# Patient Record
Sex: FEMALE | Race: WHITE | Employment: FULL TIME | URBAN - METROPOLITAN AREA
[De-identification: names, ages, dates, MRNs, and addresses within clinical notes are randomized per-mention and may not be internally consistent; named-entity substitution may affect disease eponyms.]

---

## 2017-10-17 ENCOUNTER — HOSPITAL ENCOUNTER (OUTPATIENT)
Dept: MAMMOGRAPHY | Age: 52
Discharge: HOME | End: 2017-10-17
Attending: OBSTETRICS & GYNECOLOGY | Admitting: OBSTETRICS & GYNECOLOGY

## 2017-10-17 DIAGNOSIS — Z12.31 ENCOUNTER FOR SCREENING MAMMOGRAM FOR MALIGNANT NEOPLASM OF BREAST: ICD-10-CM

## 2018-05-18 ENCOUNTER — OFFICE VISIT (OUTPATIENT)
Dept: ORTHOPEDIC SURGERY | Age: 53
End: 2018-05-18

## 2018-05-18 ENCOUNTER — HOSPITAL ENCOUNTER (OUTPATIENT)
Dept: PHYSICAL THERAPY | Age: 53
Discharge: HOME | End: 2018-05-31
Attending: ORTHOPAEDIC SURGERY | Admitting: ORTHOPAEDIC SURGERY

## 2018-05-18 VITALS
SYSTOLIC BLOOD PRESSURE: 103 MMHG | WEIGHT: 154.98 LBS | HEIGHT: 65 IN | BODY MASS INDEX: 25.82 KG/M2 | DIASTOLIC BLOOD PRESSURE: 65 MMHG | HEART RATE: 65 BPM

## 2018-05-18 DIAGNOSIS — M17.0 PRIMARY OSTEOARTHRITIS OF BOTH KNEES: Primary | ICD-10-CM

## 2018-05-18 DIAGNOSIS — M70.50 PES ANSERINE BURSITIS: ICD-10-CM

## 2018-05-18 DIAGNOSIS — M25.562 PAIN IN BOTH KNEES, UNSPECIFIED CHRONICITY: ICD-10-CM

## 2018-05-18 DIAGNOSIS — M25.561 PAIN IN BOTH KNEES, UNSPECIFIED CHRONICITY: ICD-10-CM

## 2018-05-18 PROCEDURE — 99213 OFFICE O/P EST LOW 20 MIN: CPT | Performed by: ORTHOPAEDIC SURGERY

## 2018-05-21 ENCOUNTER — HOSPITAL ENCOUNTER (OUTPATIENT)
Dept: PHYSICAL THERAPY | Age: 53
Discharge: HOME | End: 2018-05-22
Admitting: ORTHOPAEDIC SURGERY

## 2018-05-24 ENCOUNTER — HOSPITAL ENCOUNTER (OUTPATIENT)
Dept: PHYSICAL THERAPY | Age: 53
Discharge: HOME | End: 2018-05-25
Admitting: ORTHOPAEDIC SURGERY

## 2018-05-30 ENCOUNTER — TELEPHONE (OUTPATIENT)
Dept: ORTHOPEDIC SURGERY | Age: 53
End: 2018-05-30

## 2018-05-30 ENCOUNTER — HOSPITAL ENCOUNTER (OUTPATIENT)
Dept: PHYSICAL THERAPY | Age: 53
Discharge: HOME | End: 2018-06-30
Admitting: ORTHOPAEDIC SURGERY

## 2018-05-31 ENCOUNTER — OFFICE VISIT (OUTPATIENT)
Dept: ORTHOPEDIC SURGERY | Age: 53
End: 2018-05-31

## 2018-05-31 DIAGNOSIS — M70.50 PES ANSERINE BURSITIS: ICD-10-CM

## 2018-05-31 DIAGNOSIS — M17.0 PRIMARY OSTEOARTHRITIS OF BOTH KNEES: Primary | ICD-10-CM

## 2018-05-31 PROCEDURE — 20611 DRAIN/INJ JOINT/BURSA W/US: CPT | Performed by: PHYSICIAN ASSISTANT

## 2018-05-31 PROCEDURE — 99213 OFFICE O/P EST LOW 20 MIN: CPT | Performed by: PHYSICIAN ASSISTANT

## 2018-05-31 RX ORDER — DEXAMETHASONE SODIUM PHOSPHATE 4 MG/ML
4 INJECTION, SOLUTION INTRA-ARTICULAR; INTRALESIONAL; INTRAMUSCULAR; INTRAVENOUS; SOFT TISSUE PRN
Qty: 30 ML | Refills: 0 | Status: SHIPPED | OUTPATIENT
Start: 2018-05-31

## 2018-06-01 ENCOUNTER — HOSPITAL ENCOUNTER (OUTPATIENT)
Dept: PHYSICAL THERAPY | Age: 53
Discharge: HOME | End: 2018-06-02
Admitting: ORTHOPAEDIC SURGERY

## 2018-06-01 ENCOUNTER — HOSPITAL ENCOUNTER (OUTPATIENT)
Dept: PHYSICAL THERAPY | Age: 53
Discharge: HOME | End: 2018-06-01
Attending: ORTHOPAEDIC SURGERY | Admitting: ORTHOPAEDIC SURGERY

## 2018-06-06 ENCOUNTER — HOSPITAL ENCOUNTER (OUTPATIENT)
Dept: PHYSICAL THERAPY | Age: 53
Discharge: HOME | End: 2018-06-07
Admitting: ORTHOPAEDIC SURGERY

## 2018-06-07 ENCOUNTER — OFFICE VISIT (OUTPATIENT)
Dept: ORTHOPEDIC SURGERY | Age: 53
End: 2018-06-07

## 2018-06-07 DIAGNOSIS — M17.0 PRIMARY OSTEOARTHRITIS OF BOTH KNEES: Primary | ICD-10-CM

## 2018-06-07 PROCEDURE — 99999 PR OFFICE/OUTPT VISIT,PROCEDURE ONLY: CPT | Performed by: PHYSICIAN ASSISTANT

## 2018-06-07 PROCEDURE — 20611 DRAIN/INJ JOINT/BURSA W/US: CPT | Performed by: PHYSICIAN ASSISTANT

## 2018-06-08 ENCOUNTER — HOSPITAL ENCOUNTER (OUTPATIENT)
Dept: PHYSICAL THERAPY | Age: 53
Discharge: HOME | End: 2018-06-09
Admitting: ORTHOPAEDIC SURGERY

## 2018-06-13 ENCOUNTER — HOSPITAL ENCOUNTER (OUTPATIENT)
Dept: PHYSICAL THERAPY | Age: 53
Discharge: HOME | End: 2018-06-14
Admitting: ORTHOPAEDIC SURGERY

## 2018-06-14 ENCOUNTER — OFFICE VISIT (OUTPATIENT)
Dept: ORTHOPEDIC SURGERY | Age: 53
End: 2018-06-14

## 2018-06-14 DIAGNOSIS — M17.0 PRIMARY OSTEOARTHRITIS OF BOTH KNEES: Primary | ICD-10-CM

## 2018-06-14 PROCEDURE — 20611 DRAIN/INJ JOINT/BURSA W/US: CPT | Performed by: PHYSICIAN ASSISTANT

## 2018-06-14 PROCEDURE — 99999 PR OFFICE/OUTPT VISIT,PROCEDURE ONLY: CPT | Performed by: PHYSICIAN ASSISTANT

## 2018-06-27 ENCOUNTER — HOSPITAL ENCOUNTER (OUTPATIENT)
Dept: PHYSICAL THERAPY | Age: 53
Discharge: HOME | End: 2018-06-28
Admitting: ORTHOPAEDIC SURGERY

## 2018-07-01 ENCOUNTER — HOSPITAL ENCOUNTER (OUTPATIENT)
Dept: PHYSICAL THERAPY | Age: 53
Discharge: HOME | End: 2018-07-01
Attending: ORTHOPAEDIC SURGERY | Admitting: ORTHOPAEDIC SURGERY

## 2018-11-12 ENCOUNTER — HOSPITAL ENCOUNTER (OUTPATIENT)
Dept: WOMENS IMAGING | Age: 53
Discharge: HOME | End: 2018-11-12
Payer: COMMERCIAL

## 2018-11-12 DIAGNOSIS — Z12.31 ENCOUNTER FOR SCREENING MAMMOGRAM FOR BREAST CANCER: ICD-10-CM

## 2018-11-12 PROCEDURE — 77063 BREAST TOMOSYNTHESIS BI: CPT

## 2019-12-04 ENCOUNTER — HOSPITAL ENCOUNTER (OUTPATIENT)
Dept: WOMENS IMAGING | Age: 54
Discharge: HOME | End: 2019-12-04
Payer: COMMERCIAL

## 2019-12-04 DIAGNOSIS — Z12.31 VISIT FOR SCREENING MAMMOGRAM: ICD-10-CM

## 2019-12-04 PROCEDURE — 77063 BREAST TOMOSYNTHESIS BI: CPT

## 2020-06-05 ENCOUNTER — OFFICE VISIT (OUTPATIENT)
Dept: ORTHOPEDIC SURGERY | Age: 55
End: 2020-06-05
Payer: COMMERCIAL

## 2020-06-05 VITALS — WEIGHT: 154.98 LBS | HEIGHT: 65 IN | BODY MASS INDEX: 25.82 KG/M2

## 2020-06-05 PROCEDURE — 99213 OFFICE O/P EST LOW 20 MIN: CPT | Performed by: PHYSICIAN ASSISTANT

## 2020-06-16 ENCOUNTER — TELEPHONE (OUTPATIENT)
Dept: ORTHOPEDIC SURGERY | Age: 55
End: 2020-06-16

## 2020-06-22 ENCOUNTER — NURSE ONLY (OUTPATIENT)
Dept: ORTHOPEDIC SURGERY | Age: 55
End: 2020-06-22
Payer: COMMERCIAL

## 2020-06-22 PROCEDURE — 20611 DRAIN/INJ JOINT/BURSA W/US: CPT | Performed by: PHYSICIAN ASSISTANT

## 2020-06-22 RX ORDER — HYALURONATE SODIUM 10 MG/ML
40 SYRINGE (ML) INTRAARTICULAR ONCE
Status: COMPLETED | OUTPATIENT
Start: 2020-06-22 | End: 2020-06-22

## 2020-06-22 RX ADMIN — Medication 40 MG: at 11:00

## 2020-06-29 ENCOUNTER — NURSE ONLY (OUTPATIENT)
Dept: ORTHOPEDIC SURGERY | Age: 55
End: 2020-06-29
Payer: COMMERCIAL

## 2020-06-29 PROCEDURE — 20611 DRAIN/INJ JOINT/BURSA W/US: CPT | Performed by: PHYSICIAN ASSISTANT

## 2020-06-29 RX ORDER — HYALURONATE SODIUM 10 MG/ML
40 SYRINGE (ML) INTRAARTICULAR ONCE
Status: COMPLETED | OUTPATIENT
Start: 2020-06-29 | End: 2020-06-29

## 2020-06-29 RX ADMIN — Medication 40 MG: at 10:59

## 2020-07-02 ENCOUNTER — APPOINTMENT (OUTPATIENT)
Dept: CT IMAGING | Age: 55
End: 2020-07-02
Payer: COMMERCIAL

## 2020-07-02 ENCOUNTER — HOSPITAL ENCOUNTER (OUTPATIENT)
Age: 55
Setting detail: OBSERVATION
Discharge: HOME | End: 2020-07-05
Attending: EMERGENCY MEDICINE | Admitting: INTERNAL MEDICINE
Payer: COMMERCIAL

## 2020-07-02 PROBLEM — K57.92 ACUTE DIVERTICULITIS: Status: ACTIVE | Noted: 2020-07-02

## 2020-07-02 LAB
A/G RATIO: 1.5 (ref 1.1–2.2)
ALBUMIN SERPL-MCNC: 4.3 G/DL (ref 3.4–5)
ALP BLD-CCNC: 74 U/L (ref 40–129)
ALT SERPL-CCNC: 18 U/L (ref 10–40)
ANION GAP SERPL CALCULATED.3IONS-SCNC: 11 MMOL/L (ref 3–16)
AST SERPL-CCNC: 21 U/L (ref 15–37)
BASOPHILS ABSOLUTE: 0.1 K/UL (ref 0–0.2)
BASOPHILS RELATIVE PERCENT: 0.5 %
BILIRUB SERPL-MCNC: 0.3 MG/DL (ref 0–1)
BILIRUBIN URINE: NEGATIVE
BLOOD, URINE: NEGATIVE
BUN BLDV-MCNC: 10 MG/DL (ref 7–20)
CALCIUM SERPL-MCNC: 10.1 MG/DL (ref 8.3–10.6)
CHLORIDE BLD-SCNC: 96 MMOL/L (ref 99–110)
CLARITY: CLEAR
CO2: 30 MMOL/L (ref 21–32)
COLOR: YELLOW
CREAT SERPL-MCNC: 0.8 MG/DL (ref 0.6–1.1)
EOSINOPHILS ABSOLUTE: 0.4 K/UL (ref 0–0.6)
EOSINOPHILS RELATIVE PERCENT: 3.8 %
GFR AFRICAN AMERICAN: >60
GFR NON-AFRICAN AMERICAN: >60
GLOBULIN: 2.8 G/DL
GLUCOSE BLD-MCNC: 83 MG/DL (ref 70–99)
GLUCOSE URINE: NEGATIVE MG/DL
HCT VFR BLD CALC: 44.1 % (ref 36–48)
HEMOGLOBIN: 14.9 G/DL (ref 12–16)
KETONES, URINE: NEGATIVE MG/DL
LEUKOCYTE ESTERASE, URINE: NEGATIVE
LYMPHOCYTES ABSOLUTE: 3.9 K/UL (ref 1–5.1)
LYMPHOCYTES RELATIVE PERCENT: 33 %
MAGNESIUM: 1.9 MG/DL (ref 1.8–2.4)
MCH RBC QN AUTO: 30.8 PG (ref 26–34)
MCHC RBC AUTO-ENTMCNC: 33.7 G/DL (ref 31–36)
MCV RBC AUTO: 91.3 FL (ref 80–100)
MICROSCOPIC EXAMINATION: NORMAL
MONOCYTES ABSOLUTE: 0.8 K/UL (ref 0–1.3)
MONOCYTES RELATIVE PERCENT: 6.5 %
NEUTROPHILS ABSOLUTE: 6.6 K/UL (ref 1.7–7.7)
NEUTROPHILS RELATIVE PERCENT: 56.2 %
NITRITE, URINE: NEGATIVE
PDW BLD-RTO: 13 % (ref 12.4–15.4)
PH UA: 7 (ref 5–8)
PLATELET # BLD: 251 K/UL (ref 135–450)
PMV BLD AUTO: 9.6 FL (ref 5–10.5)
POTASSIUM REFLEX MAGNESIUM: 2.8 MMOL/L (ref 3.5–5.1)
PROTEIN UA: NEGATIVE MG/DL
RBC # BLD: 4.83 M/UL (ref 4–5.2)
SODIUM BLD-SCNC: 137 MMOL/L (ref 136–145)
SPECIFIC GRAVITY UA: 1.01 (ref 1–1.03)
TOTAL PROTEIN: 7.1 G/DL (ref 6.4–8.2)
URINE TYPE: NORMAL
UROBILINOGEN, URINE: 0.2 E.U./DL
WBC # BLD: 11.8 K/UL (ref 4–11)

## 2020-07-02 PROCEDURE — 85025 COMPLETE CBC W/AUTO DIFF WBC: CPT

## 2020-07-02 PROCEDURE — 6370000000 HC RX 637 (ALT 250 FOR IP): Performed by: NURSE PRACTITIONER

## 2020-07-02 PROCEDURE — 83735 ASSAY OF MAGNESIUM: CPT

## 2020-07-02 PROCEDURE — 81003 URINALYSIS AUTO W/O SCOPE: CPT

## 2020-07-02 PROCEDURE — 99285 EMERGENCY DEPT VISIT HI MDM: CPT

## 2020-07-02 PROCEDURE — 2580000003 HC RX 258: Performed by: NURSE PRACTITIONER

## 2020-07-02 PROCEDURE — G0378 HOSPITAL OBSERVATION PER HR: HCPCS

## 2020-07-02 PROCEDURE — 6360000004 HC RX CONTRAST MEDICATION: Performed by: EMERGENCY MEDICINE

## 2020-07-02 PROCEDURE — 80053 COMPREHEN METABOLIC PANEL: CPT

## 2020-07-02 PROCEDURE — 74177 CT ABD & PELVIS W/CONTRAST: CPT

## 2020-07-02 PROCEDURE — 96365 THER/PROPH/DIAG IV INF INIT: CPT

## 2020-07-02 PROCEDURE — 6360000002 HC RX W HCPCS: Performed by: NURSE PRACTITIONER

## 2020-07-02 PROCEDURE — 1200000000 HC SEMI PRIVATE

## 2020-07-02 RX ORDER — MELOXICAM 15 MG/1
15 TABLET ORAL DAILY
COMMUNITY

## 2020-07-02 RX ORDER — PROMETHAZINE HYDROCHLORIDE 25 MG/1
12.5 TABLET ORAL EVERY 6 HOURS PRN
Status: DISCONTINUED | OUTPATIENT
Start: 2020-07-02 | End: 2020-07-05 | Stop reason: HOSPADM

## 2020-07-02 RX ORDER — ACETAMINOPHEN 325 MG/1
650 TABLET ORAL EVERY 4 HOURS PRN
Status: DISCONTINUED | OUTPATIENT
Start: 2020-07-02 | End: 2020-07-05 | Stop reason: HOSPADM

## 2020-07-02 RX ORDER — LANSOPRAZOLE
15 KIT
Status: DISCONTINUED | OUTPATIENT
Start: 2020-07-03 | End: 2020-07-05 | Stop reason: HOSPADM

## 2020-07-02 RX ORDER — ONDANSETRON 2 MG/ML
4 INJECTION INTRAMUSCULAR; INTRAVENOUS EVERY 6 HOURS PRN
Status: DISCONTINUED | OUTPATIENT
Start: 2020-07-02 | End: 2020-07-05 | Stop reason: HOSPADM

## 2020-07-02 RX ORDER — OXYCODONE HYDROCHLORIDE AND ACETAMINOPHEN 5; 325 MG/1; MG/1
1 TABLET ORAL EVERY 6 HOURS PRN
Status: DISCONTINUED | OUTPATIENT
Start: 2020-07-02 | End: 2020-07-05 | Stop reason: HOSPADM

## 2020-07-02 RX ORDER — CETIRIZINE HYDROCHLORIDE 10 MG/1
10 TABLET ORAL DAILY
Status: DISCONTINUED | OUTPATIENT
Start: 2020-07-03 | End: 2020-07-05 | Stop reason: HOSPADM

## 2020-07-02 RX ORDER — ESOMEPRAZOLE MAGNESIUM 20 MG/1
20 FOR SUSPENSION ORAL DAILY
Status: DISCONTINUED | OUTPATIENT
Start: 2020-07-03 | End: 2020-07-02 | Stop reason: CLARIF

## 2020-07-02 RX ORDER — POTASSIUM CHLORIDE 20 MEQ/1
40 TABLET, EXTENDED RELEASE ORAL PRN
Status: DISCONTINUED | OUTPATIENT
Start: 2020-07-02 | End: 2020-07-05 | Stop reason: HOSPADM

## 2020-07-02 RX ORDER — POTASSIUM CHLORIDE 7.45 MG/ML
10 INJECTION INTRAVENOUS PRN
Status: DISCONTINUED | OUTPATIENT
Start: 2020-07-02 | End: 2020-07-05 | Stop reason: HOSPADM

## 2020-07-02 RX ORDER — SODIUM CHLORIDE 0.9 % (FLUSH) 0.9 %
10 SYRINGE (ML) INJECTION PRN
Status: DISCONTINUED | OUTPATIENT
Start: 2020-07-02 | End: 2020-07-05 | Stop reason: HOSPADM

## 2020-07-02 RX ORDER — BUTALBITAL, ACETAMINOPHEN AND CAFFEINE 50; 325; 40 MG/1; MG/1; MG/1
1 TABLET ORAL EVERY 4 HOURS PRN
COMMUNITY

## 2020-07-02 RX ORDER — SODIUM CHLORIDE 9 MG/ML
INJECTION, SOLUTION INTRAVENOUS CONTINUOUS
Status: DISCONTINUED | OUTPATIENT
Start: 2020-07-02 | End: 2020-07-05 | Stop reason: HOSPADM

## 2020-07-02 RX ORDER — POTASSIUM CHLORIDE 20 MEQ/1
40 TABLET, EXTENDED RELEASE ORAL ONCE
Status: COMPLETED | OUTPATIENT
Start: 2020-07-02 | End: 2020-07-02

## 2020-07-02 RX ORDER — PROPRANOLOL HYDROCHLORIDE 40 MG/1
40 TABLET ORAL 2 TIMES DAILY
Status: DISCONTINUED | OUTPATIENT
Start: 2020-07-02 | End: 2020-07-05 | Stop reason: HOSPADM

## 2020-07-02 RX ORDER — SODIUM CHLORIDE 0.9 % (FLUSH) 0.9 %
10 SYRINGE (ML) INJECTION EVERY 12 HOURS SCHEDULED
Status: DISCONTINUED | OUTPATIENT
Start: 2020-07-02 | End: 2020-07-05 | Stop reason: HOSPADM

## 2020-07-02 RX ORDER — TRIAMTERENE AND HYDROCHLOROTHIAZIDE 37.5; 25 MG/1; MG/1
2 TABLET ORAL DAILY
Status: DISCONTINUED | OUTPATIENT
Start: 2020-07-03 | End: 2020-07-05 | Stop reason: HOSPADM

## 2020-07-02 RX ADMIN — OXYCODONE HYDROCHLORIDE AND ACETAMINOPHEN 1 TABLET: 5; 325 TABLET ORAL at 23:07

## 2020-07-02 RX ADMIN — PROPRANOLOL HYDROCHLORIDE 40 MG: 40 TABLET ORAL at 22:08

## 2020-07-02 RX ADMIN — SODIUM CHLORIDE: 9 INJECTION, SOLUTION INTRAVENOUS at 22:08

## 2020-07-02 RX ADMIN — IOPAMIDOL 75 ML: 755 INJECTION, SOLUTION INTRAVENOUS at 17:19

## 2020-07-02 RX ADMIN — Medication 10 ML: at 22:07

## 2020-07-02 RX ADMIN — MEROPENEM 1 G: 1 INJECTION, POWDER, FOR SOLUTION INTRAVENOUS at 19:42

## 2020-07-02 RX ADMIN — POTASSIUM CHLORIDE 40 MEQ: 1500 TABLET, EXTENDED RELEASE ORAL at 18:14

## 2020-07-02 ASSESSMENT — PAIN DESCRIPTION - LOCATION
LOCATION: ABDOMEN

## 2020-07-02 ASSESSMENT — PAIN SCALES - GENERAL
PAINLEVEL_OUTOF10: 7
PAINLEVEL_OUTOF10: 4
PAINLEVEL_OUTOF10: 7
PAINLEVEL_OUTOF10: 6
PAINLEVEL_OUTOF10: 4

## 2020-07-02 ASSESSMENT — PAIN DESCRIPTION - PAIN TYPE: TYPE: ACUTE PAIN

## 2020-07-02 NOTE — ED PROVIDER NOTES
I independently performed a history and physical on Yanet Macias. All diagnostic, treatment, and disposition decisions were made by myself in conjunction with the advanced practice provider.     -Yanet Macias is a 63 y.o. female presents to ED for possible abdominal abscess. Had multiple episodes of diverticulitis been treated with oral antibiotics, last scan showed that she had diverticulitis with an abscess again treated with oral antibiotics. She was being evaluated by Dr. Patricia Mendoza as a follow-up. Despite being on antibiotics for a month, states that abdominal pain had returned, and was sent to the ED for reevaluation to rule out another recurrence of abdominal abscess. -PE: well appearing, nontoxic, not in acute distress. RRR, CTAB/l, no w/r/r, abdomen soft, ND, tenderness to left lower quadrant, + BS x 4, no rigidity, rebound, guarding  -Lab work was significant for hypokalemia of 2.8, mild leukocytosis of 11.8. CT abdomen and pelvis shows findings compatible with mild acute uncomplicated diverticulitis at the junction of the descending colon and sigmoid colon. Despite the given history of mild diverticulosis is not detected. No perforation is seen. I was consulted spoke with Dr. Chelo Goins who recommend the patient be admitted and started on Merrem I.V. as she this is a repeat of diverticulitis despite multiple outpatient therapy.  -Plan for admission for further workup and treatment discussed with patient who is in agreement with plan and have no further questions/concerns    For further details of Mariano JEFFREY emergency department encounter, please see LUCINDA Chiu documentation.         Denilson Roper MD  07/02/20 3164

## 2020-07-02 NOTE — ED PROVIDER NOTES
Evaluated by [id number] [facility] Provider    [facility]  ED    CHIEF COMPLAINT  Abdominal Pain (Pt has abscess on sigmoid colon.  )    HISTORY OF PRESENT ILLNESS  Carmelo Alvarado is a 63 y.o. female who presents to the ED complaining of abdominal pain. Has a abscess on her sigmoid colon. May 5th had CT saw diverticulitis treated with oral antibiotics. June 17th had another CT that showed an abscess. She saw Dr. Colt Dean today in the office for follow up and he wanted her to come for testing and antibiotics. She has been on antibiotics for a month. Augmentin then augmentin and bactrim. Had temp on June 17th, none recently. Just finished the second round of antibiotics and a few days after will soreness and pain returns. Also feels like she is bloated. Some nausea, no vomiting. Reports loose BM and diarrhea. Has had gallbladder removed and tubal ligation. The patient is currently rating their pain as 4/10 and describes it as an aching type of pain. Treatments tried prior to arrival in the ED: above. The patient denies other complaints, modifying factors or associated symptoms. The patient arrived to the ED via private car.     PAST MEDICAL HISTORY    Past Medical History:   Diagnosis Date    Environmental allergies     Hypertension        SURGICAL HISTORY    Past Surgical History:   Procedure Laterality Date    BACK SURGERY      times 2    CHOLECYSTECTOMY, LAPAROSCOPIC  5-    Laparoscopic cholecystectomy with intraoperative cholangiogram    COLONOSCOPY  1/29/16    FINGER SURGERY Left     thumb trigger finger    TUBAL LIGATION      WISDOM TOOTH EXTRACTION         CURRENT MEDICATIONS    Current Outpatient Rx   Medication Sig Dispense Refill    dexamethasone (DECADRON) 4 MG/ML injection 1 mL by Other route as needed (by physical therapist) To be applied topically by physical therapist 30 mL 0    diclofenac sodium (VOLTAREN) 1 % GEL Apply 4 g topically 4 times daily 1 Tube 3    Forced sexual activity: None   Other Topics Concern    None   Social History Narrative    None       REVIEW OFSYSTEMS    10systems reviewed, pertinent positives per HPI otherwise noted to be negative. PHYSICAL EXAM  Physical Exam  Vitals:    07/02/20 2023   BP: 120/76   Pulse: 65   Resp: 16   Temp:    SpO2: 100%       GENERAL: Patient is well-developed, well-nourished. Awake andalert. Cooperative. Resting in bed. No apparent distress. HEENT:  Normocephalic, atraumatic. Conjunctivaappear normal. Sclera is non-icteric. External ears are normal.    NECK: Supple with normal ROM. Tracheamidline  LUNGS: Equal and symmetric chest rise. Breathing is unlabored. Speaking comfortably in fullsentences. Lungs are clear bilaterally to auscultation. Without wheezing, rales, or rhonchi. CADIOVASCULAR:  Regular rate and rhythm. Normal S1-S2 sounds. No murmurs, rubs, or gallops. GI: Soft, tenderness to palpation of the LUQ, nondistended with positive bowel sounds. No rebound tenderness, guarding or rigidity. Negative Serrato's sign. NEgative Rovsing's sign. No CVAT to palpation. No masses or hepatosplenomegaly to palpation. MUSCULOSKELETAL:  No gross deformities or trauma noted. Moving all extremities equally and appropriately. Normal ROM. SKIN: Warm/dry. Skin is intact. No rashes or lesions noted. PSYCHIATRIC: Mood and affect appropriate. Speech is clear and articulate. NEUROLOGIC: Alert and oriented. No focal motor or sensory deficits.  Gait was observed and is normal.     LABS   Results for orders placed or performed during the hospital encounter of 07/02/20   CBC Auto Differential   Result Value Ref Range    WBC 11.8 (H) 4.0 - 11.0 K/uL    RBC 4.83 4.00 - 5.20 M/uL    Hemoglobin 14.9 12.0 - 16.0 g/dL    Hematocrit 44.1 36.0 - 48.0 %    MCV 91.3 80.0 - 100.0 fL    MCH 30.8 26.0 - 34.0 pg    MCHC 33.7 31.0 - 36.0 g/dL    RDW 13.0 12.4 - 15.4 %    Platelets 007 534 - 045 K/uL    MPV 9.6 5.0 - 10.5 fL Neutrophils % 56.2 %    Lymphocytes % 33.0 %    Monocytes % 6.5 %    Eosinophils % 3.8 %    Basophils % 0.5 %    Neutrophils Absolute 6.6 1.7 - 7.7 K/uL    Lymphocytes Absolute 3.9 1.0 - 5.1 K/uL    Monocytes Absolute 0.8 0.0 - 1.3 K/uL    Eosinophils Absolute 0.4 0.0 - 0.6 K/uL    Basophils Absolute 0.1 0.0 - 0.2 K/uL   Comprehensive Metabolic Panel w/ Reflex to MG   Result Value Ref Range    Sodium 137 136 - 145 mmol/L    Potassium reflex Magnesium 2.8 (LL) 3.5 - 5.1 mmol/L    Chloride 96 (L) 99 - 110 mmol/L    CO2 30 21 - 32 mmol/L    Anion Gap 11 3 - 16    Glucose 83 70 - 99 mg/dL    BUN 10 7 - 20 mg/dL    CREATININE 0.8 0.6 - 1.1 mg/dL    GFR Non-African American >60 >60    GFR African American >60 >60    Calcium 10.1 8.3 - 10.6 mg/dL    Total Protein 7.1 6.4 - 8.2 g/dL    Alb 4.3 3.4 - 5.0 g/dL    Albumin/Globulin Ratio 1.5 1.1 - 2.2    Total Bilirubin 0.3 0.0 - 1.0 mg/dL    Alkaline Phosphatase 74 40 - 129 U/L    ALT 18 10 - 40 U/L    AST 21 15 - 37 U/L    Globulin 2.8 g/dL   Urinalysis, reflex to microscopic   Result Value Ref Range    Color, UA Yellow Straw/Yellow    Clarity, UA Clear Clear    Glucose, Ur Negative Negative mg/dL    Bilirubin Urine Negative Negative    Ketones, Urine Negative Negative mg/dL    Specific Gravity, UA 1.010 1.005 - 1.030    Blood, Urine Negative Negative    pH, UA 7.0 5.0 - 8.0    Protein, UA Negative Negative mg/dL    Urobilinogen, Urine 0.2 <2.0 E.U./dL    Nitrite, Urine Negative Negative    Leukocyte Esterase, Urine Negative Negative    Microscopic Examination Not Indicated     Urine Type NotGiven    Magnesium   Result Value Ref Range    Magnesium 1.90 1.80 - 2.40 mg/dL       RADIOLOGY    Ct Abdomen Pelvis W Iv Contrast Additional Contrast? None    Result Date: 7/2/2020  EXAMINATION: CT OF THE ABDOMEN AND PELVIS WITH CONTRAST 7/2/2020 2:12 pm TECHNIQUE: CT of the abdomen and pelvis was performed with the administration of intravenous contrast. Multiplanar reformatted images are provided for review. Dose modulation, iterative reconstruction, and/or weight based adjustment of the mA/kV was utilized to reduce the radiation dose to as low as reasonably achievable. COMPARISON: None. HISTORY: ORDERING SYSTEM PROVIDED HISTORY: Known sigmoid colon abscess TECHNOLOGIST PROVIDED HISTORY: Reason for exam:->Known sigmoid colon abscess Additional Contrast?->None Reason for Exam: Known sigmoid colon abscess Acuity: Acute Type of Exam: Initial FINDINGS: Lower Chest: The visualized lungs are clear. Base of the heart is unremarkable. Visualized extra thoracic soft tissues are unremarkable. Organs: No acute or suspicious hepatic abnormalities. Portal vein is patent. The gallbladder is surgically absent. Spleen enhances normally. Normal adrenals. Normal pancreas. No acute or suspicious renal abnormalities are identified. GI/Bowel: There is mild mural thickening of the large bowel at the junction of the descending colon and sigmoid colon, with mild surrounding fat stranding, compatible with mild acute uncomplicated diverticulitis. No diverticular abscesses are identified. No macro perforation is seen. That is superimposed on a background of extensive diverticulitis involving the sigmoid colon. Distal esophagus, stomach, duodenal sweep, and the remainder of the small bowel are unremarkable in appearance. The appendix is normal. Pelvis: Uterus and adnexa regions unremarkable. No free pelvic fluid. Urinary bladder unremarkable. Peritoneum/Retroperitoneum: No retroperitoneal lymphadenopathy. Imaging of the abdominal aorta is unremarkable for the patient's age. The superior mesenteric artery is enhancing. Bones/Soft Tissues: No osteolytic or osteoblastic bone lesions. No acute fractures. No soft tissue abnormalities are detected. Findings compatible with mild acute uncomplicated diverticulitis at the junction of the descending colon and sigmoid colon.  Despite the given history, a diverticular abscess is not detected. No perforation is seen. ED COURSE/MDM  Patient seen and evaluated. Old records reviewed. Diagnostic testing reviewed and results discussed. I have seen and evaluated this patient with supervising physician: Bree Cruz MD. We thoroughly discussed the history, physical exam, diagnostic testing, emergency department course, plan and disposition. Ning Smith presented to the ED today with above noted complaints. Physical exam does reveal left upper quadrant pain to palpation. While here in the ED she is afebrile and hemodynamically stable. Blood work does show slight leukocytosis is WBC are elevated at 11.8, there is no further differential shift. No anemia. She does have a hypokalemia his potassium is low at 2.8, no other significant electrolyte abnormalities. No evidence of acute kidney injury or transaminitis. Exam levels normal.  UA is without evidence of infection, no blood to suggest kidney stone. CT of the abdomen and pelvis obtained and shows findings that are compatible with mild acute uncomplicated diverticulitis at the junction of the descending and sigmoid colon. No diverticular abscess is detected. No perforation is seen. I consulted GI and spoke with Dr. Janeen Mohs and he advised to start the patient on Merrem I.V. and to have her admitted to the hospitalist for IV antibiotics that she is failing outpatient treatment. Pt was given the following medications or treatments in the ED:   Medications   potassium chloride (KLOR-CON M) extended release tablet 40 mEq (40 mEq Oral Given 7/2/20 1814)   iopamidol (ISOVUE-370) 76 % injection 75 mL (75 mLs Intravenous Given 7/2/20 1719)   meropenem (MERREM) 1 g in sodium chloride 0.9 % 100 mL IVPB (mini-bag) (0 g Intravenous Stopped 7/2/20 2023)     I spoke with RITA Dale CNP. We thoroughly discussed the history, physical exam, laboratory and imaging studies, as well as, emergency department course.  Based upon that discussion, we've decided to admit Sienna Rasmussen for further observation and evaluation of Farhan Peck's abdominal pain. As I have deemed necessary from their history, physical and studies, I have considered and evaluated Sienna Rasmussen for the following diagnoses:  ACUTE APPENDICITIS, BOWEL OBSTRUCTION, CHOLECYSTITIS, DIVERTICULITIS, INCARCERATED HERNIA, PANCREATITIS, or PERFORATED BOWEL or ULCER. CLINICAL IMPRESSION    1. Diverticulitis of colon    2. Left upper quadrant pain    3. Failure of outpatient treatment    4. Hypokalemia       DISPOSITION  Admitted. Comment: Pleasenote this report has been produced using speech recognition software and may contain errors related to that system including errors in grammar, punctuation, and spelling, as well as words and phrases that may beinappropriate. If there are any questions or concerns please feel free to contact the dictating provider for clarification.         MAY Cunha - CNP  07/02/20 3174

## 2020-07-02 NOTE — PROGRESS NOTES
Call placed to [facility] @ [phone]  Re: diverticulitis per LUCINDA Amezquita @ 1921

## 2020-07-03 PROBLEM — K57.90 DIVERTICULOSIS: Status: ACTIVE | Noted: 2020-06-01

## 2020-07-03 LAB
A/G RATIO: 1.5 (ref 1.1–2.2)
ALBUMIN SERPL-MCNC: 3.6 G/DL (ref 3.4–5)
ALP BLD-CCNC: 63 U/L (ref 40–129)
ALT SERPL-CCNC: 14 U/L (ref 10–40)
ANION GAP SERPL CALCULATED.3IONS-SCNC: 7 MMOL/L (ref 3–16)
AST SERPL-CCNC: 18 U/L (ref 15–37)
BASOPHILS ABSOLUTE: 0 K/UL (ref 0–0.2)
BASOPHILS RELATIVE PERCENT: 0.5 %
BILIRUB SERPL-MCNC: 0.4 MG/DL (ref 0–1)
BUN BLDV-MCNC: 11 MG/DL (ref 7–20)
CALCIUM SERPL-MCNC: 9.3 MG/DL (ref 8.3–10.6)
CHLORIDE BLD-SCNC: 101 MMOL/L (ref 99–110)
CO2: 29 MMOL/L (ref 21–32)
CREAT SERPL-MCNC: 0.7 MG/DL (ref 0.6–1.1)
EOSINOPHILS ABSOLUTE: 0.4 K/UL (ref 0–0.6)
EOSINOPHILS RELATIVE PERCENT: 4.8 %
GFR AFRICAN AMERICAN: >60
GFR NON-AFRICAN AMERICAN: >60
GLOBULIN: 2.4 G/DL
GLUCOSE BLD-MCNC: 107 MG/DL (ref 70–99)
HCT VFR BLD CALC: 39.9 % (ref 36–48)
HEMOGLOBIN: 13.9 G/DL (ref 12–16)
LYMPHOCYTES ABSOLUTE: 3.5 K/UL (ref 1–5.1)
LYMPHOCYTES RELATIVE PERCENT: 39 %
MAGNESIUM: 1.8 MG/DL (ref 1.8–2.4)
MCH RBC QN AUTO: 31.9 PG (ref 26–34)
MCHC RBC AUTO-ENTMCNC: 34.8 G/DL (ref 31–36)
MCV RBC AUTO: 91.7 FL (ref 80–100)
MONOCYTES ABSOLUTE: 0.6 K/UL (ref 0–1.3)
MONOCYTES RELATIVE PERCENT: 7.1 %
NEUTROPHILS ABSOLUTE: 4.3 K/UL (ref 1.7–7.7)
NEUTROPHILS RELATIVE PERCENT: 48.6 %
PDW BLD-RTO: 12.8 % (ref 12.4–15.4)
PLATELET # BLD: 206 K/UL (ref 135–450)
PMV BLD AUTO: 9.2 FL (ref 5–10.5)
POTASSIUM REFLEX MAGNESIUM: 3.1 MMOL/L (ref 3.5–5.1)
RBC # BLD: 4.35 M/UL (ref 4–5.2)
SODIUM BLD-SCNC: 137 MMOL/L (ref 136–145)
TOTAL PROTEIN: 6 G/DL (ref 6.4–8.2)
TRIGL SERPL-MCNC: 145 MG/DL (ref 0–150)
WBC # BLD: 8.9 K/UL (ref 4–11)

## 2020-07-03 PROCEDURE — 83735 ASSAY OF MAGNESIUM: CPT

## 2020-07-03 PROCEDURE — 80053 COMPREHEN METABOLIC PANEL: CPT

## 2020-07-03 PROCEDURE — 6360000002 HC RX W HCPCS: Performed by: NURSE PRACTITIONER

## 2020-07-03 PROCEDURE — 84478 ASSAY OF TRIGLYCERIDES: CPT

## 2020-07-03 PROCEDURE — 96366 THER/PROPH/DIAG IV INF ADDON: CPT

## 2020-07-03 PROCEDURE — 96372 THER/PROPH/DIAG INJ SC/IM: CPT

## 2020-07-03 PROCEDURE — G0378 HOSPITAL OBSERVATION PER HR: HCPCS

## 2020-07-03 PROCEDURE — 1200000000 HC SEMI PRIVATE

## 2020-07-03 PROCEDURE — 85025 COMPLETE CBC W/AUTO DIFF WBC: CPT

## 2020-07-03 PROCEDURE — 6370000000 HC RX 637 (ALT 250 FOR IP): Performed by: NURSE PRACTITIONER

## 2020-07-03 PROCEDURE — 36415 COLL VENOUS BLD VENIPUNCTURE: CPT

## 2020-07-03 PROCEDURE — 2580000003 HC RX 258: Performed by: NURSE PRACTITIONER

## 2020-07-03 RX ORDER — POTASSIUM CHLORIDE 20 MEQ/1
40 TABLET, EXTENDED RELEASE ORAL ONCE
Status: COMPLETED | OUTPATIENT
Start: 2020-07-03 | End: 2020-07-03

## 2020-07-03 RX ADMIN — MEROPENEM 1 G: 1 INJECTION, POWDER, FOR SOLUTION INTRAVENOUS at 12:30

## 2020-07-03 RX ADMIN — CETIRIZINE HYDROCHLORIDE 10 MG: 10 TABLET, FILM COATED ORAL at 08:40

## 2020-07-03 RX ADMIN — SODIUM CHLORIDE: 9 INJECTION, SOLUTION INTRAVENOUS at 16:07

## 2020-07-03 RX ADMIN — TRIAMTERENE AND HYDROCHLOROTHIAZIDE 2 TABLET: 37.5; 25 TABLET ORAL at 08:40

## 2020-07-03 RX ADMIN — MEROPENEM 1 G: 1 INJECTION, POWDER, FOR SOLUTION INTRAVENOUS at 03:46

## 2020-07-03 RX ADMIN — OXYCODONE HYDROCHLORIDE AND ACETAMINOPHEN 1 TABLET: 5; 325 TABLET ORAL at 18:41

## 2020-07-03 RX ADMIN — ENOXAPARIN SODIUM 40 MG: 40 INJECTION SUBCUTANEOUS at 08:40

## 2020-07-03 RX ADMIN — PROPRANOLOL HYDROCHLORIDE 40 MG: 40 TABLET ORAL at 19:55

## 2020-07-03 RX ADMIN — MEROPENEM 1 G: 1 INJECTION, POWDER, FOR SOLUTION INTRAVENOUS at 19:56

## 2020-07-03 RX ADMIN — LANSOPRAZOLE 15 MG: KIT at 06:04

## 2020-07-03 RX ADMIN — POTASSIUM CHLORIDE 40 MEQ: 1500 TABLET, EXTENDED RELEASE ORAL at 19:55

## 2020-07-03 RX ADMIN — PROPRANOLOL HYDROCHLORIDE 40 MG: 40 TABLET ORAL at 08:40

## 2020-07-03 ASSESSMENT — PAIN SCALES - GENERAL
PAINLEVEL_OUTOF10: 2
PAINLEVEL_OUTOF10: 7

## 2020-07-03 NOTE — PROGRESS NOTES
Pt. Resting in bed. Alert/oriented. Vitals and assessment stable as charted. States she feels much better today. Pain 2/10; tolerable. Denies nausea. Call light in reach. Will continue to monitor.

## 2020-07-03 NOTE — CARE COORDINATION
Chart review completed. Patient is a 63year old female from home where she lives independently. Her spouse Liss Weldon is listed as primary contact # [phone] . She has a PCP listed as Norm OLVERA. She is insured with [facility]. She has a PMHX of diverticular abscess. Treated with PO Augmentin in May by PCP for acute diverticulitis. She presented with abdominal pain this admission as an inpatient and had another CT scan that revealed acute uncomplicated diverticulitis at the junction of descending colon and sigmoid colon. No abscess was detected. GI consulted and IV Merrem started in ED per GI recommendations. Dr. Lynn Estrella saw patient today and wrote to continue IVABX's and advance diet. He anticipates patient being here 1 to days and will need outpatient colonoscopy 6 weeks after symptoms subside. Will follow for barriers or needs pertaining to discharge however anticipate patient will have no needs.

## 2020-07-03 NOTE — PROGRESS NOTES
GI Progress Note      SUBJECTIVE:  Pt feels better.   Denies fevers, nausea, emesis of hematochezia    OBJECTIVE      Medications    Current Facility-Administered Medications: cetirizine (ZYRTEC) tablet 10 mg, 10 mg, Oral, Daily  propranolol (INDERAL) tablet 40 mg, 40 mg, Oral, BID  triamterene-hydroCHLOROthiazide (MAXZIDE-25) 37.5-25 MG per tablet 2 tablet, 2 tablet, Oral, Daily  0.9 % sodium chloride infusion, , Intravenous, Continuous  sodium chloride flush 0.9 % injection 10 mL, 10 mL, Intravenous, 2 times per day  sodium chloride flush 0.9 % injection 10 mL, 10 mL, Intravenous, PRN  potassium chloride (KLOR-CON M) extended release tablet 40 mEq, 40 mEq, Oral, PRN **OR** potassium bicarb-citric acid (EFFER-K) effervescent tablet 40 mEq, 40 mEq, Oral, PRN **OR** potassium chloride 10 mEq/100 mL IVPB (Peripheral Line), 10 mEq, Intravenous, PRN  acetaminophen (TYLENOL) tablet 650 mg, 650 mg, Oral, Q4H PRN  promethazine (PHENERGAN) tablet 12.5 mg, 12.5 mg, Oral, Q6H PRN **OR** ondansetron (ZOFRAN) injection 4 mg, 4 mg, Intravenous, Q6H PRN  enoxaparin (LOVENOX) injection 40 mg, 40 mg, Subcutaneous, Daily  meropenem (MERREM) 1 g in sodium chloride 0.9 % 100 mL IVPB (mini-bag), 1 g, Intravenous, Q8H  lansoprazole suspension SUSP 15 mg, 15 mg, Oral, QAM AC  oxyCODONE-acetaminophen (PERCOCET) 5-325 MG per tablet 1 tablet, 1 tablet, Oral, Q6H PRN  Physical    VITALS:  /64   Pulse 59   Temp 98.4 °F (36.9 °C) (Oral)   Resp 16   Ht 5' 5\" (1.651 m)   Wt 156 lb (70.8 kg)   LMP 01/26/2013   SpO2 98%   BMI 25.96 kg/m²   ABD: soft, ND, NABs, mild LLQ tenderness  Data    CBC with Differential:    Lab Results   Component Value Date    WBC 8.9 07/03/2020    RBC 4.35 07/03/2020    HGB 13.9 07/03/2020    HCT 39.9 07/03/2020     07/03/2020    MCV 91.7 07/03/2020    MCH 31.9 07/03/2020    MCHC 34.8 07/03/2020    RDW 12.8 07/03/2020    SEGSPCT 60 10/09/2015    LYMPHOPCT 39.0 07/03/2020    MONOPCT 7.1 07/03/2020 BASOPCT 0.5 07/03/2020    MONOSABS 0.6 07/03/2020    LYMPHSABS 3.5 07/03/2020    EOSABS 0.4 07/03/2020    BASOSABS 0.0 07/03/2020     CMP:    Lab Results   Component Value Date     07/03/2020    K 3.1 07/03/2020     07/03/2020    CO2 29 07/03/2020    BUN 11 07/03/2020    CREATININE 0.7 07/03/2020    GFRAA >60 07/03/2020    GFRAA >60 05/20/2013    AGRATIO 1.5 07/03/2020    LABGLOM >60 07/03/2020    GLUCOSE 107 07/03/2020    PROT 6.0 07/03/2020    LABALBU 3.6 07/03/2020    CALCIUM 9.3 07/03/2020    BILITOT 0.4 07/03/2020    ALKPHOS 63 07/03/2020    AST 18 07/03/2020    ALT 14 07/03/2020     Findings compatible with mild acute uncomplicated diverticulitis at the   junction of the descending colon and sigmoid colon.       Despite the given history, a diverticular abscess is not detected.  No   perforation is seen. ASSESSMENT AND PLAN  61yo F with a h/o arthritis and HTN was admitted through the ED after a visit in my office yesterday. She was dx with acute uncomplicated diverticulitis involving the sigmoid on 5/5. A course of ATB was helpful but sxs quickly returned after the course of treatment. CT was repeated on 6/18 demonstrated intramural abscesses in the setting of persistent diverticulitis. ATBs were provided to which she did not respond. I saw her yesterday for the first time and recommended admission. CT though in the ED yesterday did not demonstrate abscesses nor perforation.   - continue IV ATB  - advance diet  - anticipate d/c in 1-2 days  - a colonoscopy will take place 6 weeks after sxs subside

## 2020-07-03 NOTE — PROGRESS NOTES
4 Eyes Skin Assessment     The patient is being assess for  Admission    I agree that 2 RN's have performed a thorough Head to Toe Skin Assessment on the patient. ALL assessment sites listed below have been assessed. Areas assessed by both nurses: Devon Patel. And Preet Maxwell RNs  [x]   Head, Face, and Ears   [x]   Shoulders, Back, and Chest  [x]   Arms, Elbows, and Hands   [x]   Coccyx, Sacrum, and Ischum  [x]   Legs, Feet, and Heels         Does the Patient have Skin Breakdown?   No         Anthony Prevention initiated:  No   Wound Care Orders initiated:  No      [facility] nurse consulted for Pressure Injury (Stage 3,4, Unstageable, DTI, NWPT, and Complex wounds):  No      Nurse 1 eSignature: Electronically signed by Shauna Crocker RN on 7/2/20 at 11:47 PM EDT    **SHARE this note so that the co-signing nurse is able to place an eSignature**    Nurse 2 eSignature: Electronically signed by Piter York RN on 7/3/20 at 6:01 AM EDT

## 2020-07-03 NOTE — PROGRESS NOTES
/63. Pt still wants to take her BP meds; states she \"goes up and down\". Administered AM meds as ordered.

## 2020-07-03 NOTE — PLAN OF CARE
Problem: Falls - Risk of:  Goal: Will remain free from falls  Description: Will remain free from falls  Outcome: Ongoing  Goal: Absence of physical injury  Description: Absence of physical injury  Outcome: Ongoing     Problem: Pain:  Goal: Pain level will decrease  Description: Pain level will decrease  Outcome: Ongoing  Goal: Control of acute pain  Description: Control of acute pain  Outcome: Ongoing  Goal: Control of chronic pain  Description: Control of chronic pain  Outcome: Ongoing     Problem: Activity:  Goal: Ability to tolerate increased activity will improve  Description: Ability to tolerate increased activity will improve  Outcome: Ongoing     Problem:  Bowel/Gastric:  Goal: Bowel function will improve  Description: Bowel function will improve  Outcome: Ongoing  Goal: Complications related to the disease process, condition or treatment will be avoided or minimized  Description: Complications related to the disease process, condition or treatment will be avoided or minimized  Outcome: Ongoing     Problem: Coping:  Goal: Ability to identify strategies to decrease anxiety will improve  Description: Ability to identify strategies to decrease anxiety will improve  Outcome: Ongoing     Problem: Fluid Volume:  Goal: Will maintain adequate fluid volume  Description: Will maintain adequate fluid volume  Outcome: Ongoing     Problem: Nutritional:  Goal: Nutritional status will improve  Description: Nutritional status will improve  Outcome: Ongoing     Problem: Sensory:  Goal: Pain level will decrease  Description: Pain level will decrease  Outcome: Ongoing

## 2020-07-03 NOTE — H&P
Hospital Medicine History & Physical      PCP: MAY Horton CNP    Date of Admission: 7/2/2020    Date of Service: Pt seen/examined on 7/2/2020 and Admitted to Inpatient with expected LOS greater than two midnights due to medical therapy. Chief Complaint:    Chief Complaint   Patient presents with    Abdominal Pain     Pt has abscess on sigmoid colon. History Of Present Illness:      63 y.o. female, with PMH of diverticular abscess, HTN, and allergies, who presented to [facility] with abdominal pain. History was obtained from the patient and review of the EMR. The patient states that she began having diffuse abdominal pain starting in early May 2020. She was found to have acute diverticulitis and was placed on Augmentin by her PCP. However, the pain continued into June and she had a repeat CT which revealed acute diverticulitis that had mildly progressed from the previous with suspicion for intramural abscess. She was then given prescription for Augmentin and Bactrim and was told to follow-up with her GI physician, Dr. Rupa Lara. She finished out her antibiotics and after a few days, the pain returned and she decided to come into the ED for further evaluation today. The patient states that earlier today, she was eating popcorn when she suddenly developed diffuse abdominal pain, worse on the left than right. She states that she saw her GI physician earlier today and he advised her to go to the ED for further evaluation as she would likely need another CT scan. The patient denied any other associated symptoms as well as any aggravating and/or alleviating factors. At the time of this assessment, the patient was resting comfortably in bed. She currently denies any chest pain, back pain, abdominal pain, shortness of breath, numbness, tingling, N/V/C/D, fever and/or chills.      Past Medical History:          Diagnosis Date    Arthritis     Diverticulosis 06/01/2020    years ago. She has a 40.00 pack-year smoking history. She has never used smokeless tobacco.  ETOH:   reports current alcohol use. Family History:      Reviewed in detail. Positive as follows:    History reviewed. No pertinent family history. REVIEW OF SYSTEMS:   Pertinent positives as noted in the HPI. All other systems reviewed and negative. PHYSICAL EXAM PERFORMED:    /79   Pulse 65   Temp 97.8 °F (36.6 °C) (Oral)   Resp 14   Ht 5' 5\" (1.651 m)   Wt 156 lb (70.8 kg)   LMP 01/26/2013   SpO2 96%   BMI 25.96 kg/m²     General appearance: Pleasant female in no apparent distress, appears stated age and cooperative. HEENT:  Normal cephalic, atraumatic without obvious deformity. Pupils equal, round, and reactive to light. Extra ocular muscles intact. Conjunctivae/corneas clear. Neck: Supple, with full range of motion. No jugular venous distention. Trachea midline. Respiratory:  Normal respiratory effort. Clear to auscultation, bilaterally without Rales/Wheezes/Rhonchi. Cardiovascular:  Regular rate and rhythm with normal S1/S2 without murmurs, rubs or gallops. Abdomen: Soft, tender to left side, distended with normal bowel sounds. Musculoskeletal:  No clubbing, cyanosis or edema bilaterally. Full range of motion without deformity. Skin: Skin color, texture, turgor normal.  No rashes or lesions. Neurologic:  Neurovascularly intact without any focal sensory/motor deficits.  Cranial nerves: II-XII intact, grossly non-focal.  Psychiatric:  Alert and oriented, thought content appropriate, normal insight  Capillary Refill: Brisk,< 3 seconds   Peripheral Pulses: +2 palpable, equal bilaterally       Labs:     Recent Labs     07/02/20  1610   WBC 11.8*   HGB 14.9   HCT 44.1        Recent Labs     07/02/20  1610      K 2.8*   CL 96*   CO2 30   BUN 10   CREATININE 0.8   CALCIUM 10.1     Recent Labs     07/02/20  1610   AST 21   ALT 18   BILITOT 0.3   ALKPHOS 74     No results for input(s): INR in the last 72 hours. No results for input(s): Rosaura Josue in the last 72 hours. Urinalysis:      Lab Results   Component Value Date    NITRU Negative 07/02/2020    BACTERIA Few 10/09/2015    RBCUA 0-2 10/09/2015    BLOODU Negative 07/02/2020    SPECGRAV 1.010 07/02/2020    GLUCOSEU Negative 07/02/2020       Radiology:     CXR: I have reviewed the CXR with the following interpretation: n/a  EKG:  I have reviewed the EKG with the following interpretation: n/a    CT ABDOMEN PELVIS W IV CONTRAST Additional Contrast? None   Final Result   Findings compatible with mild acute uncomplicated diverticulitis at the   junction of the descending colon and sigmoid colon. Despite the given history, a diverticular abscess is not detected. No   perforation is seen. ASSESSMENT:    Active Hospital Problems    Diagnosis Date Noted    Acute diverticulitis [K57.92] 07/02/2020         PLAN:    Acute uncomplicated diverticulitis in setting of known diverticulosis since  - CT abdomen pelvis revealed: Mild acute uncomplicated diverticulitis at the junction of the descending colon and sigmoid colon. Diverticular abscess is not detected. No perforation is seen. - GI was consulted in the ED, thank you for recommendations  - IV Merrem started in the ED per GI recommendations, continued on 7/2/2020  - Analgesia and antiemetics as needed  - MIVF's  - NPO for now    Essential HTN, well controlled. - Continue home Maxide, propanolol  - Telemetry monitoring    Tobacco abuse disorder. Current pack a day smoker.  - Tobacco cessation education  - Nicotine patch offered, patient declined      Environmental allergies, stable  -Continue home Zyrtec      DVT Prophylaxis: Lovenox  Diet: Diet NPO Effective Now Exceptions are: Ice Chips  Code Status: Full Code    PT/OT Eval Status: Not indicated    Dispo -pending clinical improvement and GI work-up       Diana Martell - NP    Thank you Nitza Herrera

## 2020-07-03 NOTE — PROGRESS NOTES
non-distended with normal bowel sounds. Musculoskeletal: No clubbing, cyanosis or edema bilaterally. Full range of motion without deformity. Skin: Skin color, texture, turgor normal.  No rashes or lesions. Neurologic:  Neurovascularly intact without any focal sensory/motor deficits. Cranial nerves: II-XII intact, grossly non-focal.  Psychiatric: Alert and oriented, thought content appropriate, normal insight  Capillary Refill: Brisk,< 3 seconds   Peripheral Pulses: +2 palpable, equal bilaterally       Labs:   Recent Labs     07/02/20  1610 07/03/20  0527   WBC 11.8* 8.9   HGB 14.9 13.9   HCT 44.1 39.9    206     Recent Labs     07/02/20  1610 07/03/20  0527    137   K 2.8* 3.1*   CL 96* 101   CO2 30 29   BUN 10 11   CREATININE 0.8 0.7   CALCIUM 10.1 9.3     Recent Labs     07/02/20  1610 07/03/20  0527   AST 21 18   ALT 18 14   BILITOT 0.3 0.4   ALKPHOS 74 63     No results for input(s): INR in the last 72 hours. No results for input(s): Burnadette Lundborg in the last 72 hours. Urinalysis:      Lab Results   Component Value Date    NITRU Negative 07/02/2020    BACTERIA Few 10/09/2015    RBCUA 0-2 10/09/2015    BLOODU Negative 07/02/2020    SPECGRAV 1.010 07/02/2020    GLUCOSEU Negative 07/02/2020       Radiology:  CT ABDOMEN PELVIS W IV CONTRAST Additional Contrast? None   Final Result   Findings compatible with mild acute uncomplicated diverticulitis at the   junction of the descending colon and sigmoid colon. Despite the given history, a diverticular abscess is not detected. No   perforation is seen. Assessment/Plan:    Active Hospital Problems    Diagnosis    Hypertension [I10]    Environmental allergies [Z91.09]    Acute diverticulitis [K57.92]    Diverticulosis [K57.90]     1. Acute uncomplicated diverticulitis continue with IV antibiotic pill p.o. antibiotic outpatient treatment. GI consulted.   2.  Hypertension continue with current medication monitor blood pressure closely discontinue telemetry monitoring. 3.  Tobacco abuse recommended patient to stop smoking.         DVT Prophylaxis: Lovenox subcu  Diet: Diet NPO Effective Now Exceptions are: Ice Chips  Code Status: Full Code    PT/OT Eval Status:     Shannan Perrin MD

## 2020-07-04 PROBLEM — K57.92 DIVERTICULITIS: Status: ACTIVE | Noted: 2020-07-04

## 2020-07-04 LAB
ANION GAP SERPL CALCULATED.3IONS-SCNC: 9 MMOL/L (ref 3–16)
BUN BLDV-MCNC: 10 MG/DL (ref 7–20)
CALCIUM SERPL-MCNC: 9 MG/DL (ref 8.3–10.6)
CHLORIDE BLD-SCNC: 105 MMOL/L (ref 99–110)
CO2: 25 MMOL/L (ref 21–32)
CREAT SERPL-MCNC: 0.7 MG/DL (ref 0.6–1.1)
GFR AFRICAN AMERICAN: >60
GFR NON-AFRICAN AMERICAN: >60
GLUCOSE BLD-MCNC: 100 MG/DL (ref 70–99)
HCT VFR BLD CALC: 39.5 % (ref 36–48)
HEMOGLOBIN: 13.4 G/DL (ref 12–16)
MCH RBC QN AUTO: 31.2 PG (ref 26–34)
MCHC RBC AUTO-ENTMCNC: 33.9 G/DL (ref 31–36)
MCV RBC AUTO: 92 FL (ref 80–100)
PDW BLD-RTO: 13.2 % (ref 12.4–15.4)
PLATELET # BLD: 219 K/UL (ref 135–450)
PMV BLD AUTO: 9.1 FL (ref 5–10.5)
POTASSIUM SERPL-SCNC: 3.3 MMOL/L (ref 3.5–5.1)
RBC # BLD: 4.29 M/UL (ref 4–5.2)
SODIUM BLD-SCNC: 139 MMOL/L (ref 136–145)
WBC # BLD: 9.5 K/UL (ref 4–11)

## 2020-07-04 PROCEDURE — 6360000002 HC RX W HCPCS: Performed by: NURSE PRACTITIONER

## 2020-07-04 PROCEDURE — 80048 BASIC METABOLIC PNL TOTAL CA: CPT

## 2020-07-04 PROCEDURE — 2580000003 HC RX 258: Performed by: NURSE PRACTITIONER

## 2020-07-04 PROCEDURE — 96366 THER/PROPH/DIAG IV INF ADDON: CPT

## 2020-07-04 PROCEDURE — 96372 THER/PROPH/DIAG INJ SC/IM: CPT

## 2020-07-04 PROCEDURE — G0378 HOSPITAL OBSERVATION PER HR: HCPCS

## 2020-07-04 PROCEDURE — 36415 COLL VENOUS BLD VENIPUNCTURE: CPT

## 2020-07-04 PROCEDURE — 85027 COMPLETE CBC AUTOMATED: CPT

## 2020-07-04 PROCEDURE — 6370000000 HC RX 637 (ALT 250 FOR IP): Performed by: NURSE PRACTITIONER

## 2020-07-04 PROCEDURE — 6370000000 HC RX 637 (ALT 250 FOR IP): Performed by: HOSPITALIST

## 2020-07-04 RX ORDER — POTASSIUM CHLORIDE 750 MG/1
40 TABLET, EXTENDED RELEASE ORAL 2 TIMES DAILY
Status: DISPENSED | OUTPATIENT
Start: 2020-07-04 | End: 2020-07-05

## 2020-07-04 RX ADMIN — MEROPENEM 1 G: 1 INJECTION, POWDER, FOR SOLUTION INTRAVENOUS at 20:09

## 2020-07-04 RX ADMIN — OXYCODONE HYDROCHLORIDE AND ACETAMINOPHEN 1 TABLET: 5; 325 TABLET ORAL at 21:34

## 2020-07-04 RX ADMIN — POTASSIUM CHLORIDE 40 MEQ: 1500 TABLET, EXTENDED RELEASE ORAL at 06:46

## 2020-07-04 RX ADMIN — LANSOPRAZOLE 15 MG: KIT at 07:03

## 2020-07-04 RX ADMIN — MEROPENEM 1 G: 1 INJECTION, POWDER, FOR SOLUTION INTRAVENOUS at 12:00

## 2020-07-04 RX ADMIN — PROPRANOLOL HYDROCHLORIDE 40 MG: 40 TABLET ORAL at 08:36

## 2020-07-04 RX ADMIN — OXYCODONE HYDROCHLORIDE AND ACETAMINOPHEN 1 TABLET: 5; 325 TABLET ORAL at 15:26

## 2020-07-04 RX ADMIN — TRIAMTERENE AND HYDROCHLOROTHIAZIDE 2 TABLET: 37.5; 25 TABLET ORAL at 08:35

## 2020-07-04 RX ADMIN — PROPRANOLOL HYDROCHLORIDE 40 MG: 40 TABLET ORAL at 20:08

## 2020-07-04 RX ADMIN — POTASSIUM CHLORIDE 40 MEQ: 750 TABLET, EXTENDED RELEASE ORAL at 20:09

## 2020-07-04 RX ADMIN — CETIRIZINE HYDROCHLORIDE 10 MG: 10 TABLET, FILM COATED ORAL at 08:35

## 2020-07-04 RX ADMIN — ENOXAPARIN SODIUM 40 MG: 40 INJECTION SUBCUTANEOUS at 08:36

## 2020-07-04 RX ADMIN — OXYCODONE HYDROCHLORIDE AND ACETAMINOPHEN 1 TABLET: 5; 325 TABLET ORAL at 08:59

## 2020-07-04 RX ADMIN — MEROPENEM 1 G: 1 INJECTION, POWDER, FOR SOLUTION INTRAVENOUS at 03:53

## 2020-07-04 ASSESSMENT — PAIN - FUNCTIONAL ASSESSMENT: PAIN_FUNCTIONAL_ASSESSMENT: ACTIVITIES ARE NOT PREVENTED

## 2020-07-04 ASSESSMENT — PAIN DESCRIPTION - LOCATION: LOCATION: BACK

## 2020-07-04 ASSESSMENT — PAIN SCALES - GENERAL
PAINLEVEL_OUTOF10: 7
PAINLEVEL_OUTOF10: 7
PAINLEVEL_OUTOF10: 8

## 2020-07-04 ASSESSMENT — PAIN DESCRIPTION - PAIN TYPE: TYPE: ACUTE PAIN

## 2020-07-04 NOTE — FLOWSHEET NOTE
Pt A&Ox4. VSS. Shift assessment completed. Pt tolerating low fiber diet well. No reports of nausea or vomiting. Pt passing gas, no BM yet. Denies pain or other needs at this time. Call light within reach, bed in lowest position, wheels locked. Will monitor.

## 2020-07-04 NOTE — PLAN OF CARE
Problem: Falls - Risk of:  Goal: Will remain free from falls  Description: Will remain free from falls  Outcome: Ongoing  Goal: Absence of physical injury  Description: Absence of physical injury  Outcome: Ongoing     Problem: Pain:  Goal: Pain level will decrease  Description: Pain level will decrease  Outcome: Ongoing  Goal: Control of acute pain  Description: Control of acute pain  Outcome: Ongoing  Goal: Control of chronic pain  Description: Control of chronic pain  Outcome: Ongoing     Problem: Activity:  Goal: Ability to tolerate increased activity will improve  Description: Ability to tolerate increased activity will improve  Outcome: Ongoing     Problem:  Bowel/Gastric:  Goal: Bowel function will improve  Description: Bowel function will improve  Outcome: Ongoing  Goal: Complications related to the disease process, condition or treatment will be avoided or minimized  Description: Complications related to the disease process, condition or treatment will be avoided or minimized  Outcome: Ongoing

## 2020-07-04 NOTE — PROGRESS NOTES
07/04/2020    MCHC 33.9 07/04/2020    RDW 13.2 07/04/2020    SEGSPCT 60 10/09/2015    LYMPHOPCT 39.0 07/03/2020    MONOPCT 7.1 07/03/2020    BASOPCT 0.5 07/03/2020    MONOSABS 0.6 07/03/2020    LYMPHSABS 3.5 07/03/2020    EOSABS 0.4 07/03/2020    BASOSABS 0.0 07/03/2020     BMP:    Lab Results   Component Value Date     07/04/2020    K 3.3 07/04/2020    K 3.1 07/03/2020     07/04/2020    CO2 25 07/04/2020    BUN 10 07/04/2020    LABALBU 3.6 07/03/2020    CREATININE 0.7 07/04/2020    CALCIUM 9.0 07/04/2020    GFRAA >60 07/04/2020    GFRAA >60 05/20/2013    LABGLOM >60 07/04/2020    GLUCOSE 100 07/04/2020       ASSESSMENT AND PLAN  63yo F with a h/o arthritis and HTN was admitted through the ED after a visit in my office yesterday. She was dx with acute uncomplicated diverticulitis involving the sigmoid on 5/5. A course of ATB was helpful but sxs quickly returned after the course of treatment. CT was repeated on 6/18 demonstrated intramural abscesses in the setting of persistent diverticulitis. ATBs were provided to which she did not respond. I saw her Thursday for the first time and recommended admission. CT though in the ED yesterday did not demonstrate abscesses nor perforation.   Sxs are improving  - continue IV ATB  - anticipate d/c tomorrow with a 10 day course of Cipro and Flagyl (given her lack of response to Augmentin and Bactrim)  - a colonoscopy will take place 6 weeks after sxs subside

## 2020-07-04 NOTE — PROGRESS NOTES
Hospitalist Progress Note      PCP: MAY Cornell - CNP    Date of Admission: 7/2/2020    Chief Complaint: Abdominal pain    Hospital Course: This is a 69-year-old female with a past medical history of a diverticular abscess, hypertension admitted with abdominal pain since May 2020 treated with p.o. antibiotic, now on meropenem. Subjective: Patient states abdominal pain is improved tolerating low fiber diet denies any chest pain or shortness of breath no nausea vomiting or diarrhea      Medications:  Reviewed    Infusion Medications    sodium chloride 75 mL/hr at 07/03/20 2118     Scheduled Medications    cetirizine  10 mg Oral Daily    propranolol  40 mg Oral BID    triamterene-hydroCHLOROthiazide  2 tablet Oral Daily    sodium chloride flush  10 mL Intravenous 2 times per day    enoxaparin  40 mg Subcutaneous Daily    meropenem  1 g Intravenous Q8H    lansoprazole  15 mg Oral QAM AC     PRN Meds: sodium chloride flush, potassium chloride **OR** potassium alternative oral replacement **OR** potassium chloride, acetaminophen, promethazine **OR** ondansetron, oxyCODONE-acetaminophen      Intake/Output Summary (Last 24 hours) at 7/4/2020 0857  Last data filed at 7/4/2020 0841  Gross per 24 hour   Intake 4532 ml   Output 2600 ml   Net 1932 ml       Physical Exam Performed:    /81   Pulse 67   Temp 97.6 °F (36.4 °C) (Oral)   Resp 15   Ht 5' 5\" (1.651 m)   Wt 156 lb (70.8 kg)   LMP 01/26/2013   SpO2 96%   BMI 25.96 kg/m²     General appearance: No apparent distress, appears stated age and cooperative. HEENT: Pupils equal, round, and reactive to light. Conjunctivae/corneas clear. Neck: Supple, with full range of motion. No jugular venous distention. Trachea midline. Respiratory:  Normal respiratory effort. Clear to auscultation, bilaterally without Rales/Wheezes/Rhonchi. Cardiovascular: Regular rate and rhythm with normal S1/S2 without murmurs, rubs or gallops.   Abdomen: Soft, non-tender, non-distended with normal bowel sounds. Musculoskeletal: No clubbing, cyanosis or edema bilaterally. Full range of motion without deformity. Skin: Skin color, texture, turgor normal.  No rashes or lesions. Neurologic:  Neurovascularly intact without any focal sensory/motor deficits. Cranial nerves: II-XII intact, grossly non-focal.  Psychiatric: Alert and oriented, thought content appropriate, normal insight  Capillary Refill: Brisk,< 3 seconds   Peripheral Pulses: +2 palpable, equal bilaterally       Labs:   Recent Labs     07/02/20 1610 07/03/20  0527 07/04/20  0610   WBC 11.8* 8.9 9.5   HGB 14.9 13.9 13.4   HCT 44.1 39.9 39.5    206 219     Recent Labs     07/02/20 1610 07/03/20  0527 07/04/20  0610    137 139   K 2.8* 3.1* 3.3*   CL 96* 101 105   CO2 30 29 25   BUN 10 11 10   CREATININE 0.8 0.7 0.7   CALCIUM 10.1 9.3 9.0     Recent Labs     07/02/20 1610 07/03/20  0527   AST 21 18   ALT 18 14   BILITOT 0.3 0.4   ALKPHOS 74 63     No results for input(s): INR in the last 72 hours. No results for input(s): [place] in the last 72 hours. Urinalysis:      Lab Results   Component Value Date    NITRU Negative 07/02/2020    BACTERIA Few 10/09/2015    RBCUA 0-2 10/09/2015    BLOODU Negative 07/02/2020    SPECGRAV 1.010 07/02/2020    GLUCOSEU Negative 07/02/2020       Radiology:  CT ABDOMEN PELVIS W IV CONTRAST Additional Contrast? None   Final Result   Findings compatible with mild acute uncomplicated diverticulitis at the   junction of the descending colon and sigmoid colon. Despite the given history, a diverticular abscess is not detected. No   perforation is seen. Assessment/Plan:    Active Hospital Problems    Diagnosis    Hypertension [I10]    Environmental allergies [Z91.09]    Acute diverticulitis [K57.92]    Diverticulosis [K57.90]     1.   This is a 62-year-old female admitted with a diverticulitis failed p.o. antibiotic treatment outpatient since May 2020 GI consulted and following we will continue with IV meropenem discussed with the patient possible discharge home in a.m.. 2.  Hypertension continue with the current medication monitor blood pressure. 3.  Hypokalemia from p.o. potassium, check BMP in a.m.. 4.  Tobacco abuse recommended patient to stop smoking.     DVT Prophylaxis: Lovenox subcu  Diet: DIET LOW FIBER;  Code Status: Full Code    PT/OT Eval Status:     Giovanna Breen MD

## 2020-07-05 VITALS
RESPIRATION RATE: 15 BRPM | HEIGHT: 65 IN | HEART RATE: 73 BPM | DIASTOLIC BLOOD PRESSURE: 61 MMHG | BODY MASS INDEX: 25.99 KG/M2 | SYSTOLIC BLOOD PRESSURE: 104 MMHG | WEIGHT: 156 LBS | TEMPERATURE: 98 F | OXYGEN SATURATION: 97 %

## 2020-07-05 LAB
ANION GAP SERPL CALCULATED.3IONS-SCNC: 8 MMOL/L (ref 3–16)
BUN BLDV-MCNC: 10 MG/DL (ref 7–20)
CALCIUM SERPL-MCNC: 9 MG/DL (ref 8.3–10.6)
CHLORIDE BLD-SCNC: 102 MMOL/L (ref 99–110)
CO2: 28 MMOL/L (ref 21–32)
CREAT SERPL-MCNC: 0.7 MG/DL (ref 0.6–1.1)
GFR AFRICAN AMERICAN: >60
GFR NON-AFRICAN AMERICAN: >60
GLUCOSE BLD-MCNC: 94 MG/DL (ref 70–99)
HCT VFR BLD CALC: 38.6 % (ref 36–48)
HEMOGLOBIN: 13.1 G/DL (ref 12–16)
MCH RBC QN AUTO: 31.2 PG (ref 26–34)
MCHC RBC AUTO-ENTMCNC: 34 G/DL (ref 31–36)
MCV RBC AUTO: 91.8 FL (ref 80–100)
PDW BLD-RTO: 12.8 % (ref 12.4–15.4)
PLATELET # BLD: 208 K/UL (ref 135–450)
PMV BLD AUTO: 9.1 FL (ref 5–10.5)
POTASSIUM SERPL-SCNC: 3.5 MMOL/L (ref 3.5–5.1)
RBC # BLD: 4.2 M/UL (ref 4–5.2)
SODIUM BLD-SCNC: 138 MMOL/L (ref 136–145)
WBC # BLD: 7.5 K/UL (ref 4–11)

## 2020-07-05 PROCEDURE — G0378 HOSPITAL OBSERVATION PER HR: HCPCS

## 2020-07-05 PROCEDURE — 2580000003 HC RX 258: Performed by: NURSE PRACTITIONER

## 2020-07-05 PROCEDURE — 96366 THER/PROPH/DIAG IV INF ADDON: CPT

## 2020-07-05 PROCEDURE — 85027 COMPLETE CBC AUTOMATED: CPT

## 2020-07-05 PROCEDURE — 36415 COLL VENOUS BLD VENIPUNCTURE: CPT

## 2020-07-05 PROCEDURE — 6370000000 HC RX 637 (ALT 250 FOR IP): Performed by: NURSE PRACTITIONER

## 2020-07-05 PROCEDURE — 80048 BASIC METABOLIC PNL TOTAL CA: CPT

## 2020-07-05 PROCEDURE — 6360000002 HC RX W HCPCS: Performed by: NURSE PRACTITIONER

## 2020-07-05 PROCEDURE — 96372 THER/PROPH/DIAG INJ SC/IM: CPT

## 2020-07-05 RX ORDER — METRONIDAZOLE 250 MG/1
500 TABLET ORAL EVERY 8 HOURS SCHEDULED
Status: CANCELLED | OUTPATIENT
Start: 2020-07-05

## 2020-07-05 RX ORDER — CIPROFLOXACIN 500 MG/1
500 TABLET, FILM COATED ORAL 2 TIMES DAILY
Qty: 20 TABLET | Refills: 0 | Status: SHIPPED | OUTPATIENT
Start: 2020-07-05 | End: 2020-07-15

## 2020-07-05 RX ORDER — CIPROFLOXACIN 500 MG/1
500 TABLET, FILM COATED ORAL EVERY 12 HOURS SCHEDULED
Status: CANCELLED | OUTPATIENT
Start: 2020-07-05

## 2020-07-05 RX ORDER — METRONIDAZOLE 500 MG/1
500 TABLET ORAL 2 TIMES DAILY
Qty: 30 TABLET | Refills: 0 | Status: SHIPPED | OUTPATIENT
Start: 2020-07-05 | End: 2020-07-15

## 2020-07-05 RX ADMIN — MEROPENEM 1 G: 1 INJECTION, POWDER, FOR SOLUTION INTRAVENOUS at 11:56

## 2020-07-05 RX ADMIN — MEROPENEM 1 G: 1 INJECTION, POWDER, FOR SOLUTION INTRAVENOUS at 04:21

## 2020-07-05 RX ADMIN — OXYCODONE HYDROCHLORIDE AND ACETAMINOPHEN 1 TABLET: 5; 325 TABLET ORAL at 05:01

## 2020-07-05 RX ADMIN — CETIRIZINE HYDROCHLORIDE 10 MG: 10 TABLET, FILM COATED ORAL at 09:01

## 2020-07-05 RX ADMIN — TRIAMTERENE AND HYDROCHLOROTHIAZIDE 2 TABLET: 37.5; 25 TABLET ORAL at 09:01

## 2020-07-05 RX ADMIN — LANSOPRAZOLE 15 MG: KIT at 06:32

## 2020-07-05 RX ADMIN — ENOXAPARIN SODIUM 40 MG: 40 INJECTION SUBCUTANEOUS at 09:01

## 2020-07-05 ASSESSMENT — PAIN SCALES - GENERAL: PAINLEVEL_OUTOF10: 8

## 2020-07-05 NOTE — PROGRESS NOTES
Shift assessment completed. Patient alert and oriented, vital signs stable, denies any needs at this time. Call light within reach, will continue to monitor.

## 2020-07-05 NOTE — DISCHARGE SUMMARY
Hospital Medicine Discharge Summary    Patient ID: Doss[facility]      Patient's PCP: MAY Ayala - CNP    Admit Date: 7/2/2020     Discharge Date:  7/5/2020    Admitting Physician: Hiram Patel MD     Discharge Physician: Neftali Olivo MD     Discharge Diagnoses: Active Hospital Problems    Diagnosis    Diverticulitis [K57.92]    Hypertension [I10]    Environmental allergies [Z91.09]    Acute diverticulitis [K57.92]    Diverticulosis [K57.90]       The patient was seen and examined on day of discharge and this discharge summary is in conjunction with any daily progress note from day of discharge. Hospital Course: This is a 58-year-old female with a past medical history of a diverticular abscess, hypertension admitted with abdominal pain since May 2020 treated with p.o. antibiotic, admitted and treated with IV meropenem. 1.  This is a 58-year-old female admitted with a diverticulitis failed p.o. antibiotic treatment outpatient since May 2020 GI consulted and following, patient continues to improve we will discontinue IV meropenem discharge patient home on p.o. Cipro and Flagyl patient to follow-up with GI as instructed. 2.  Hypertension continue with the current medication monitor blood pressure. 3.  Hypokalemia status post supplement resolved. .  4.  Tobacco abuse recommended patient to stop smoking. Physical Exam Performed:     /61   Pulse 73   Temp 98 °F (36.7 °C) (Oral)   Resp 15   Ht 5' 5\" (1.651 m)   Wt 156 lb (70.8 kg)   LMP 01/26/2013   SpO2 97%   BMI 25.96 kg/m²       General appearance:  No apparent distress, appears stated age and cooperative. HEENT:  Normal cephalic, atraumatic without obvious deformity. Pupils equal, round, and reactive to light. Extra ocular muscles intact. Conjunctivae/corneas clear. Neck: Supple, with full range of motion. No jugular venous distention. Trachea midline. Respiratory:  Normal respiratory effort.  Clear to auscultation, bilaterally without Rales/Wheezes/Rhonchi. Cardiovascular:  Regular rate and rhythm with normal S1/S2 without murmurs, rubs or gallops. Abdomen: Soft, non-tender, non-distended with normal bowel sounds. Musculoskeletal:  No clubbing, cyanosis or edema bilaterally. Full range of motion without deformity. Skin: Skin color, texture, turgor normal.  No rashes or lesions. Neurologic:  Neurovascularly intact without any focal sensory/motor deficits. Cranial nerves: II-XII intact, grossly non-focal.  Psychiatric:  Alert and oriented, thought content appropriate, normal insight  Capillary Refill: Brisk,< 3 seconds   Peripheral Pulses: +2 palpable, equal bilaterally       Labs: For convenience and continuity at follow-up the following most recent labs are provided:      CBC:    Lab Results   Component Value Date    WBC 7.5 07/05/2020    HGB 13.1 07/05/2020    HCT 38.6 07/05/2020     07/05/2020       Renal:    Lab Results   Component Value Date     07/05/2020    K 3.5 07/05/2020    K 3.1 07/03/2020     07/05/2020    CO2 28 07/05/2020    BUN 10 07/05/2020    CREATININE 0.7 07/05/2020    CALCIUM 9.0 07/05/2020         Significant Diagnostic Studies    Radiology:   CT ABDOMEN PELVIS W IV CONTRAST Additional Contrast? None   Final Result   Findings compatible with mild acute uncomplicated diverticulitis at the   junction of the descending colon and sigmoid colon. Despite the given history, a diverticular abscess is not detected. No   perforation is seen.                 Consults:     IP CONSULT TO HOSPITALIST    Disposition: Home    Condition at Discharge: Stable    Discharge Instructions/Follow-up: Follow-up with GI as instructed    Code Status:  Full Code     Activity: activity as tolerated    Diet: Low fiber      Discharge Medications:     Current Discharge Medication List           Details   ciprofloxacin (CIPRO) 500 MG tablet Take 1 tablet by mouth 2 times daily for 10 days  Qty: 20 tablet, Refills: 0      metroNIDAZOLE (FLAGYL) 500 MG tablet Take 1 tablet by mouth 2 times daily for 10 days  Qty: 30 tablet, Refills: 0              Details   dexamethasone (DECADRON) 4 MG/ML injection 1 mL by Other route as needed (by physical therapist) To be applied topically by physical therapist  Qty: 30 mL, Refills: 0      triamterene-hydrochlorothiazide (MAXZIDE) 75-50 MG per tablet Take 1 tablet by mouth daily      esomeprazole Magnesium (NEXIUM) 20 MG PACK Take 20 mg by mouth daily      Fexofenadine HCl (ALLEGRA PO) Take 1 tablet by mouth daily      propranolol (INDERAL) 40 MG tablet Take 40 mg by mouth 2 times daily. POTASSIUM CHLORIDE PO Take  by mouth. otc med 550mg?      meloxicam (MOBIC) 15 MG tablet Take 15 mg by mouth daily      butalbital-acetaminophen-caffeine (FIORICET, ESGIC) -40 MG per tablet Take 1 tablet by mouth every 4 hours as needed for Headaches      diclofenac sodium (VOLTAREN) 1 % GEL Apply 4 g topically 4 times daily  Qty: 1 Tube, Refills: 3      HYDROcodone-acetaminophen (NORCO) 5-325 MG per tablet       Loratadine 10 MG CAPS Take  by mouth daily. Time Spent on discharge is more than 35 minutes in the examination, evaluation, counseling and review of medications and discharge plan. Signed:    Neftali Olivo MD   7/5/2020      Thank you MAY Ayaal CNP for the opportunity to be involved in this patient's care. If you have any questions or concerns please feel free to contact me at [phone].

## 2020-07-05 NOTE — PROGRESS NOTES
with normal bowel sounds. Musculoskeletal: No clubbing, cyanosis or edema bilaterally. Full range of motion without deformity. Skin: Skin color, texture, turgor normal.  No rashes or lesions. Neurologic:  Neurovascularly intact without any focal sensory/motor deficits. Cranial nerves: II-XII intact, grossly non-focal.  Psychiatric: Alert and oriented, thought content appropriate, normal insight  Capillary Refill: Brisk,< 3 seconds   Peripheral Pulses: +2 palpable, equal bilaterally       Labs:   Recent Labs     07/03/20  0527 07/04/20  0610 07/05/20  0624   WBC 8.9 9.5 7.5   HGB 13.9 13.4 13.1   HCT 39.9 39.5 38.6    219 208     Recent Labs     07/03/20 0527 07/04/20  0610 07/05/20  0624    139 138   K 3.1* 3.3* 3.5    105 102   CO2 29 25 28   BUN 11 10 10   CREATININE 0.7 0.7 0.7   CALCIUM 9.3 9.0 9.0     Recent Labs     07/02/20  1610 07/03/20  0527   AST 21 18   ALT 18 14   BILITOT 0.3 0.4   ALKPHOS 74 63     No results for input(s): INR in the last 72 hours. No results for input(s): [place] in the last 72 hours. Urinalysis:      Lab Results   Component Value Date    NITRU Negative 07/02/2020    BACTERIA Few 10/09/2015    RBCUA 0-2 10/09/2015    BLOODU Negative 07/02/2020    SPECGRAV 1.010 07/02/2020    GLUCOSEU Negative 07/02/2020       Radiology:  CT ABDOMEN PELVIS W IV CONTRAST Additional Contrast? None   Final Result   Findings compatible with mild acute uncomplicated diverticulitis at the   junction of the descending colon and sigmoid colon. Despite the given history, a diverticular abscess is not detected. No   perforation is seen. Assessment/Plan:    Active Hospital Problems    Diagnosis    Diverticulitis [K57.92]    Hypertension [I10]    Environmental allergies [Z91.09]    Acute diverticulitis [K57.92]    Diverticulosis [K57.90]     1.   This is a 48-year-old female admitted with a diverticulitis failed p.o. antibiotic treatment outpatient since May 2020 GI consulted and following, patient continues to improve we will discontinue IV meropenem discharge patient home on p.o. Cipro and Flagyl patient to follow-up with GI as instructed. 2.  Hypertension continue with the current medication monitor blood pressure. 3.  Hypokalemia status post supplement resolved. .  4.  Tobacco abuse recommended patient to stop smoking.       DVT Prophylaxis: Lovenox subcu  Diet: DIET LOW FIBER;  Code Status: Full Code    PT/OT Eval Status:     Severino Perry MD

## 2020-07-06 ENCOUNTER — NURSE ONLY (OUTPATIENT)
Dept: ORTHOPEDIC SURGERY | Age: 55
End: 2020-07-06
Payer: COMMERCIAL

## 2020-07-06 PROCEDURE — 20611 DRAIN/INJ JOINT/BURSA W/US: CPT | Performed by: PHYSICIAN ASSISTANT

## 2020-07-06 RX ORDER — HYALURONATE SODIUM 10 MG/ML
40 SYRINGE (ML) INTRAARTICULAR ONCE
Status: COMPLETED | OUTPATIENT
Start: 2020-07-06 | End: 2020-07-06

## 2020-07-06 RX ADMIN — Medication 40 MG: at 11:05

## 2020-07-06 NOTE — PROGRESS NOTES
Chief Complaint   Patient presents with    Knee Pain     #3 EUFLEXXA YOON KNEES     Dx: Osteoarthritis right and left knee      The patient returns today for their third and final right and left knee Visco supplementation injection. The risks, benefits, and complications of the injections were again discussed in detail with the patient. The risks discussed include but are not limited to infection, skin reactions, hot swollen joints, and anaphylaxis. The patient gave verbal informed consent for the injection. The patient's skin was prepped with 3 sterile gauze pads soaked with alcohol solution and the knee joint was injected with 2cc Euflexxa intra-articularly under sterile conditions. Technique: Under sterile conditions a [employer] ultrasound unit with a variable frequency (6.0-15.0 MHz) linear transducer was used to localize the placement of a 22-gauge needle into the right and left knee joint. Findings: Successful needle placement for intra-articular Visco supplementation injection. Final images were taken and saved for permanent record. The patient tolerated the injection reasonably well. The patient was given instructions to ice of the knee and avoid strenuous activity for 24-48 hours. The patient was instructed to call the office immediately if there is increased pain, redness, warmth, fever, or chills. We will see the patient back on an as-needed basis  from this point. Gibran [facility]    This dictation was performed with a verbal recognition program [facility]) and it was checked for errors. It is possible that there are still dictated errors within this office note. If so, please bring any errors to my attention for an addendum. All efforts were made to ensure that this office note is accurate.

## 2020-09-12 ENCOUNTER — HOSPITAL ENCOUNTER (EMERGENCY)
Age: 55
Discharge: HOME | End: 2020-09-12
Payer: COMMERCIAL

## 2020-09-12 VITALS
TEMPERATURE: 98.1 F | HEART RATE: 87 BPM | DIASTOLIC BLOOD PRESSURE: 77 MMHG | RESPIRATION RATE: 16 BRPM | SYSTOLIC BLOOD PRESSURE: 137 MMHG | OXYGEN SATURATION: 98 % | HEIGHT: 65 IN | BODY MASS INDEX: 25.83 KG/M2 | WEIGHT: 155 LBS

## 2020-09-12 PROCEDURE — 99282 EMERGENCY DEPT VISIT SF MDM: CPT

## 2020-09-12 RX ORDER — OXYCODONE HYDROCHLORIDE AND ACETAMINOPHEN 5; 325 MG/1; MG/1
1 TABLET ORAL EVERY 6 HOURS PRN
Qty: 12 TABLET | Refills: 0 | Status: SHIPPED | OUTPATIENT
Start: 2020-09-12 | End: 2020-09-15

## 2020-09-12 RX ORDER — PREDNISONE 50 MG/1
50 TABLET ORAL DAILY
Qty: 7 TABLET | Refills: 0 | Status: SHIPPED | OUTPATIENT
Start: 2020-09-12 | End: 2020-09-19

## 2020-09-12 RX ORDER — VALACYCLOVIR HYDROCHLORIDE 1 G/1
1000 TABLET, FILM COATED ORAL 3 TIMES DAILY
Qty: 21 TABLET | Refills: 0 | Status: SHIPPED | OUTPATIENT
Start: 2020-09-12 | End: 2020-09-19

## 2020-09-12 ASSESSMENT — PAIN DESCRIPTION - PAIN TYPE: TYPE: ACUTE PAIN

## 2020-09-12 ASSESSMENT — PAIN SCALES - GENERAL: PAINLEVEL_OUTOF10: 6

## 2020-09-12 NOTE — ED PROVIDER NOTES
[facility]  ED  eMERGENCY dEPARTMENT eNCOUnter        Pt Name: Leidy Renae  MRN: [id number]  [place] 1965  Date of evaluation: 9/12/2020  Provider: Asmita Hodgson PA-C  PCP: MAY Beatty - CNP  ED Attending: Jamaica Schafer MD    Patient was not seen by the ED attending  History is provided by the patient    CHIEF COMPLAINT:  Rash (rash on right neck and right chest/back. states burning pain. noticed on tuesday. )      HISTORY OF PRESENT ILLNESS:  Leidy Renae is a 63 y.o. female who presents to the ED via private vehicle with complaints of painful, itchy rash. This patient noted a small erythematous rash to the mid, upper chest on Tuesday-9/8. Since that time she has developed other portions of rash to the right lateral chest wall/shoulder region and right upper back. She describes both burning pain and itching with this. She reports her whole shoulder area feels achy. She tried both hydrocortisone cream and Neosporin without relief. When more areas of rash started erupting on her skin she decided to come to the ED for evaluation today. She is never had anything like this before she has remotely had chickenpox, as a child. She rates her pain 6/10 upon arrival.  No other complaints, modifying factors or associated symptoms. Nursing notes reviewed. Past Medical History:   Diagnosis Date    Arthritis     Diverticulosis 06/01/2020    Environmental allergies     Hypertension      Past Surgical History:   Procedure Laterality Date    BACK SURGERY      times 2    CHOLECYSTECTOMY, LAPAROSCOPIC  5-    Laparoscopic cholecystectomy with intraoperative cholangiogram    COLONOSCOPY  1/29/16    FINGER SURGERY Left     thumb trigger finger    TUBAL LIGATION      WISDOM TOOTH EXTRACTION       History reviewed. No pertinent family history.   Social History     Socioeconomic History    Marital status:      Spouse name: Not on file    Number of children: Not on file    Years of education: Not on file    Highest education level: Not on file   Occupational History    Not on file   Social Needs    Financial resource strain: Not on file    Food insecurity     Worry: Not on file     Inability: Not on file    Transportation needs     Medical: Not on file     Non-medical: Not on file   Tobacco Use    Smoking status: Current Every Day Smoker     Packs/day: 1.00     Years: 40.00     Pack years: 40.00     Start date: 1980    Smokeless tobacco: Never Used   Substance and Sexual Activity    Alcohol use: Yes     Comment: 2-3 drinks per month    Drug use: No    Sexual activity: Not on file   Lifestyle    Physical activity     Days per week: Not on file     Minutes per session: Not on file    Stress: Not on file   Relationships    Social connections     Talks on phone: Not on file     Gets together: Not on file     Attends Restorationist service: Not on file     Active member of club or organization: Not on file     Attends meetings of clubs or organizations: Not on file     Relationship status: Not on file    Intimate partner violence     Fear of current or ex partner: Not on file     Emotionally abused: Not on file     Physically abused: Not on file     Forced sexual activity: Not on file   Other Topics Concern    Not on file   Social History Narrative    Not on file     No current facility-administered medications for this encounter. Current Outpatient Medications   Medication Sig Dispense Refill    valACYclovir (VALTREX) 1 g tablet Take 1 tablet by mouth 3 times daily for 7 days 21 tablet 0    oxyCODONE-acetaminophen (PERCOCET) 5-325 MG per tablet Take 1 tablet by mouth every 6 hours as needed for Pain for up to 3 days. Intended supply: 3 days.  Take lowest dose possible to manage pain 12 tablet 0    predniSONE (DELTASONE) 50 MG tablet Take 1 tablet by mouth daily for 7 days 7 tablet 0    meloxicam (MOBIC) 15 MG tablet Take 15 mg by mouth daily      butalbital-acetaminophen-caffeine (FIORICET, ESGIC) -40 MG per tablet Take 1 tablet by mouth every 4 hours as needed for Headaches      dexamethasone (DECADRON) 4 MG/ML injection 1 mL by Other route as needed (by physical therapist) To be applied topically by physical therapist 30 mL 0    diclofenac sodium (VOLTAREN) 1 % GEL Apply 4 g topically 4 times daily 1 Tube 3    triamterene-hydrochlorothiazide (MAXZIDE) 75-50 MG per tablet Take 1 tablet by mouth daily      esomeprazole Magnesium (NEXIUM) 20 MG PACK Take 20 mg by mouth daily      Fexofenadine HCl (ALLEGRA PO) Take 1 tablet by mouth daily      HYDROcodone-acetaminophen (NORCO) 5-325 MG per tablet       propranolol (INDERAL) 40 MG tablet Take 40 mg by mouth 2 times daily.  POTASSIUM CHLORIDE PO Take  by mouth. otc med 550mg?  Loratadine 10 MG CAPS Take  by mouth daily. Allergies   Allergen Reactions    Cephalexin Other (See Comments)     \"raw tongue\"    Morphine Itching    Erythromycin Nausea And Vomiting    Moxifloxacin Hcl In Nacl Rash       REVIEW OF SYSTEMS:  6 systems reviewed, pertinent positives per HPI otherwise noted to be negative. PHYSICAL EXAM:  /77   Pulse 87   Temp 98.1 °F (36.7 °C) (Oral)   Resp 16   Ht 5' 5\" (1.651 m)   Wt 155 lb (70.3 kg)   LMP 01/26/2013   SpO2 98%   BMI 25.79 kg/m²   CONSTITUTIONAL: Awake and alert. Well-developed. Well-nourished. Non-toxic. Cooperative. No acute distress. HENT: Normocephalic. Atraumatic. External ears normal, without discharge. Nose normal. Mucous membranes moist.  EYES: Conjunctiva non-injected. No scleral icterus. PERRL. EOM's grossly intact. NECK: Supple. Normal ROM. CARDIOVASCULAR: Normal heart rate. Intact distal pulses. PULMONARY/CHEST WALL: Breathing is unlabored. Equal, symmetric chest rise. Speaking comfortably in full sentences. ABDOMEN: Nondistended  MUSKULOSKELETAL: Normal ROM. No acute deformities. SKIN: Warm and dry.   There is a scattered erythematous based rash to the right upper chest/shoulder/right upper back. There are overlying vesicular lesions and tenderness to palpate. Appearance is consistent with herpes zoster. NEUROLOGICAL: Alert and oriented x 3. Strength is 5/5 in all extremities and sensation is intact. PSYCHIATRIC: Normal affect    Labs:    None    RADIOLOGY:    None            ED COURSE/MDM:  Patient was given the following medications: none      I have evaluated this patient here in the ED. This patient is here with a red-based vesicular rash involving just the right upper chest, shoulder and back. Rash is consistent with herpes zoster given the appearance and distribution. Patient educated on this. She will be placed on antivirals, pain medication and steroids. I offered her a first dose in the ED but she would prefer to just get prescriptions and go fill them. She has primary care follow-up. All questions answered prior to discharge. Patient was given scripts for the following medications. I counseled patient how to take these medications. Discharge Medication List as of 9/12/2020  2:59 PM      START taking these medications    Details   valACYclovir (VALTREX) 1 g tablet Take 1 tablet by mouth 3 times daily for 7 days, Disp-21 tablet,R-0Print      oxyCODONE-acetaminophen (PERCOCET) 5-325 MG per tablet Take 1 tablet by mouth every 6 hours as needed for Pain for up to 3 days. Intended supply: 3 days. Take lowest dose possible to manage pain, Disp-12 tablet,R-0Print      predniSONE (DELTASONE) 50 MG tablet Take 1 tablet by mouth daily for 7 days, Disp-7 tablet,R-0Print           I estimate there is LOW risk for ABSCESS, CELLULITIS, COMPARTMENT SYNDROME, NECROTIZING FASCIITIS, TENDON OR NEUROVASCULAR INJURY, or RETAINED FOREIGN BODY, thus I consider the discharge disposition reasonable. Also, there is no evidence or peritonitis, sepsis, or toxicity.  Santosh Guzmán and I have discussed the diagnosis and risks, and we agree with discharging home to follow-up with their primary doctor. We also discussed returning to the Emergency Department immediately if new or worsening symptoms occur. We have discussed the symptoms which are most concerning (e.g., changing or worsening pain, fever, numbness, weakness, cool or painful digits) that necessitate immediate return. CLINICAL IMPRESSION:  1. Herpes zoster without complication        Blood pressure 137/77, pulse 87, temperature 98.1 °F (36.7 °C), temperature source Oral, resp. rate 16, height 5' 5\" (1.651 m), weight 155 lb (70.3 kg), last menstrual period 01/26/2013, SpO2 98 %. PATIENT REFERRED TO:  Alonzo Fitzgerald, APRN - CNP  [address] [facility]  [phone]    Schedule an appointment as soon as possible for a visit           DISPOSITION  Patient was discharged to home in good condition.           Francis Gallegos [place]  09/12/20 1421

## 2020-09-12 NOTE — ED NOTES
--Patient provided with discharge instructions. --Instructions, and follow-up appointments reviewed with patient/family. No further questions or needs at this time. --Vital signs and patient stable upon discharge. --Patient ambulatory to [facility].          [facility]  09/12/20 [id number]

## 2021-03-18 ENCOUNTER — HOSPITAL ENCOUNTER (OUTPATIENT)
Dept: WOMENS IMAGING | Age: 56
Discharge: HOME | End: 2021-03-18
Payer: COMMERCIAL

## 2021-03-18 DIAGNOSIS — Z12.31 ENCOUNTER FOR SCREENING MAMMOGRAM FOR BREAST CANCER: ICD-10-CM

## 2021-03-18 PROCEDURE — 77063 BREAST TOMOSYNTHESIS BI: CPT

## 2022-03-21 ENCOUNTER — HOSPITAL ENCOUNTER (OUTPATIENT)
Dept: WOMENS IMAGING | Age: 57
Discharge: HOME | End: 2022-03-21
Payer: COMMERCIAL

## 2022-03-21 DIAGNOSIS — Z12.31 SCREENING MAMMOGRAM FOR BREAST CANCER: ICD-10-CM

## 2022-03-21 PROCEDURE — 77063 BREAST TOMOSYNTHESIS BI: CPT

## 2022-06-20 NOTE — PROGRESS NOTES
Shift assessment completed. Patient alert and oriented, vital signs stable, denies any needs at this time. Pain controlled with PO pain medication. Patient reports flatus, no BM yet. Is tolerating diet. Call light within reach, will continue to monitor. Detail Level: Generalized Detail Level: Detailed

## 2023-06-08 NOTE — PROGRESS NOTES
2023       Kimmie Abraham MD  [address] [facility] [address]  Via In Basket      Patient: Anupama Braun   YOB: 1963   Date of Visit: 2023       Dear Dr. Abraham:    I saw your patient, Anupama Braun, for an evaluation. Below are my notes for this visit with her.    If you have questions, please do not hesitate to call me.      Sincerely,        Juvenal Gar MD        CC: No Recipients  Juvenal Gar MD  2023 11:41 AM  Signed  RHEUMATOLOGY FOLLOW UP VISIT    Name: Anupama Braun  : 1963     Referred by: Kimmie Abraham MD    Chief Complaint   Patient presents with   • Rheumatoid Arthritis     4 month follow up. No pain reported today.       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  She had renal insufficiency but it has now resolved  Labs showed CCP positive 102  RF positive 214, Hand X-Ray 10/3/17: showed erosions consistent with severe rheumatoid arthritis  The patient is now on methotrexate 6 tabs once a week and folic acid 1mg daily. No side effects with it.    She is actually doing great with no pain shoulders elbows hands knees or ankles and denied joint swelling.  Morning stiffness lasts a few minutes  She has no neck or back pain now also diagnosed  Also diagnosis of obstructive sleep apnea and uses CPAP.      REVIEW OF SYSTEMS  Constitutional: no fevers or chills no weight loss  EYES: No dry eyes, no blurred vision, no diplopia, no history of iritis  ENT:  no dry mouth,no oral ulcers, no dysphagia, no chronic sinus disease   Cardiovascular: no chest pain orthopnea no PND no palpitations  Respiratory: no cough no shortness of breath no wheezing no stridor no dyspnea on exertion  GI: no nausea, no vomiting, no diarrhea, no constipation no heartburn  :no dysuria urgency no hematuria  LORENA: as above no raynauds  Heme:no history of  Pt arrived to floor at approx 2100 via bed in stable condition. Pts belongings were accounted for and put into her locker. Pt was oriented to her room and her admission and 4eye were completed. Pt is a/o x4. VSS. Assessment as charted. - Pt has slightly distended abd w/ hypoactive bowel sounds- pt did complain of intermittent pain rating as high as a 7/10- T. Chito, NP was notified and PRN Perc was ordered and given. - Pt has body aches in back and knees r/t arthritis and is currently getting decadron shots Q Monday. Pt is currently resting in her bed that is locked and in its lowest position w/ her call light within reach, non-skid socks on, and bed alarm off d/t pt being independent. Pt denies any other needs at this time. Will continue to monitor. deep venous thrombosis or pulmonary embolism no anemia, no swollen glands  CNS: no tingling or numbness,no weakness, no ataxia  PSY :no anxiety or depression  INTEGUMENTARY: no hair loss, no rashes  ENDO: No polyuria, no polydipsia    Past Medical History:   Diagnosis Date   • Arthritis    • Lumbar spondylosis    • Lung nodule         Past Surgical History:   Procedure Laterality Date   •  section, low transverse     • Hernia repair         Social History     Tobacco Use   • Smoking status: Former     Current packs/day: 0.00   • Smokeless tobacco: Never   Vaping Use   • Vaping Use: never used   Substance Use Topics   • Alcohol use: Yes       Current Outpatient Medications   Medication Sig Dispense Refill   • methotrexate (RHEUMATREX) 2.5 MG tablet Take 6 tablets by mouth 1 day a week. 80 tablet 0   • folic acid (FOLATE) 1 MG tablet Take 1 tablet by mouth daily. 90 tablet 3   • EVENING PRIMROSE OIL PO      • ECHINACEA COMPLEX PO      • ELDERBERRY PO      • BLACK CURRANT SEED OIL PO      • Cholecalciferol (VITAMIN D) 2000 units capsule        No current facility-administered medications for this visit.           PHYSICAL EXAM    Vitals:    23 1058 23 1101   BP: (!) 144/82 139/81   BP Location: LUE - Left upper extremity RUE - Right upper extremity   Patient Position: Sitting Sitting   Cuff Size: Regular Regular   Pulse: 64    Temp: 98.3 °F (36.8 °C)    TempSrc: Oral    SpO2: 98%    Weight: 66.2 kg (145 lb 15.1 oz)    Height: 5' 4\" (1.626 m)    PainSc:  0      Body mass index is 25.05 kg/m².     Skin: no ulcers, no malar rash, no petechia no purpura.    Head and Face: Atraumatic no deformities normocephalic normal facies  Eyes: Pink conjunctiva, anicteric sclera, no periorbital swelling, no ptosis, pupils reactive to light, extraocular muscles intact.  No dry eyes.    ENT: No oral ulcers, no nasal ulcers,  no sinus tenderness, no malar rash, no temporal artery tenderness, no oral thrush, no cough  dry mouth, no oral ulcers.  No TMJ tenderness     Neck: Fairly good range of motion of C-spine, no paracervical tenderness, no goiter, no adenopathy, no supraclavicular masses.    Cardiac exam: S1-S2 regular, no murmurs.    Lungs: clear, no rales or wheezing, no abnormal breath sounds, good breath sounds bilaterally.    Abdomen: no hepatomegaly or splenomegaly or tenderness, no masses, no ascites.    Back: no SI joint tenderness, no paralumbar tenderness,   Musculoskeletal exam:  Good range of motion bilateral shoulder joint with no tenderness  Bilateral elbows no synovitis or tenderness  Bilateral wrist joints no synovitis or tenderness  Bilateral MCP joints no synovitis or tenderness  Bilateral PIP joints no synovitis and tenderness  Bilateral DIP joints no synovitis or tenderness  Both knees no effusion warmth or tenderness no knee instability  Both ankles no synovitis or tenderness  Bilateral MTP joints no synovitis or tenderness no dactylitis  Good range of motion bilateral hip joints with no tenderness    Neurological exam: Normal gait, normal motor strength in upper and lower extremity, normal muscle tone, no tremors alert oriented x3.  good bilateral hand , no muscle atrophy.    Pain scale 0 out of 10  Tender joint 0  Swollen joint 0  ESR 12 mm/h  LOPEZ 28 1.74 consistent with clinical remission      LABs  Lab Services on 06/06/2023   Component Date Value Ref Range Status   • Sodium 06/06/2023 143  135 - 145 mmol/L Final   • Potassium 06/06/2023 4.0  3.4 - 5.1 mmol/L Final   • Chloride 06/06/2023 108  97 - 110 mmol/L Final   • Carbon Dioxide 06/06/2023 28  21 - 32 mmol/L Final   • Anion Gap 06/06/2023 11  7 - 19 mmol/L Final   • Glucose 06/06/2023 118 (H)  70 - 99 mg/dL Final   • BUN 06/06/2023 13  6 - 20 mg/dL Final   • Creatinine 06/06/2023 0.92  0.51 - 0.95 mg/dL Final   • Glomerular Filtration Rate 06/06/2023 71  >=60 Final    eGFR results = or >60 mL/min/1.73m2 = Normal kidney function. Estimated GFR  calculated using the CKD-EPI-R (2021) equation that does not include race in the creatinine calculation.   • BUN/Cr 06/06/2023 14  7 - 25 Final   • Calcium 06/06/2023 9.7  8.4 - 10.2 mg/dL Final   • Bilirubin, Total 06/06/2023 0.5  0.2 - 1.0 mg/dL Final   • GOT/AST 06/06/2023 21  <=37 Units/L Final   • GPT/ALT 06/06/2023 25  <64 Units/L Final   • Alkaline Phosphatase 06/06/2023 115  45 - 117 Units/L Final   • Albumin 06/06/2023 3.8  3.6 - 5.1 g/dL Final   • Protein, Total 06/06/2023 6.9  6.4 - 8.2 g/dL Final   • Globulin 06/06/2023 3.1  2.0 - 4.0 g/dL Final   • A/G Ratio 06/06/2023 1.2  1.0 - 2.4 Final   • RBC Sedimentation Rate 06/06/2023 12  0 - 20 mm/hr Final   • C-Reactive Protein 06/06/2023 <0.3  <=1.0 mg/dL Final   • WBC 06/06/2023 5.7  4.2 - 11.0 K/mcL Final   • RBC 06/06/2023 4.30  4.00 - 5.20 mil/mcL Final   • HGB 06/06/2023 13.2  12.0 - 15.5 g/dL Final   • HCT 06/06/2023 40.7  36.0 - 46.5 % Final   • MCV 06/06/2023 94.7  78.0 - 100.0 fl Final   • MCH 06/06/2023 30.7  26.0 - 34.0 pg Final   • MCHC 06/06/2023 32.4  32.0 - 36.5 g/dL Final   • RDW-CV 06/06/2023 13.5  11.0 - 15.0 % Final   • RDW-SD 06/06/2023 45.6  39.0 - 50.0 fL Final   • PLT 06/06/2023 319  140 - 450 K/mcL Final   • NRBC 06/06/2023 0  <=0 /100 WBC Final   • Neutrophil, Percent 06/06/2023 60  % Final   • Lymphocytes, Percent 06/06/2023 32  % Final   • Mono, Percent 06/06/2023 3  % Final   • Eosinophils, Percent 06/06/2023 4  % Final   • Basophils, Percent 06/06/2023 1  % Final   • Immature Granulocytes 06/06/2023 0  % Final   • Absolute Neutrophils 06/06/2023 3.4  1.8 - 7.7 K/mcL Final   • Absolute Lymphocytes 06/06/2023 1.8  1.0 - 4.0 K/mcL Final   • Absolute Monocytes 06/06/2023 0.2 (L)  0.3 - 0.9 K/mcL Final   • Absolute Eosinophils  06/06/2023 0.2  0.0 - 0.5 K/mcL Final   • Absolute Basophils 06/06/2023 0.1  0.0 - 0.3 K/mcL Final   • Absolute Immature Granulocytes 06/06/2023 0.0  0.0 - 0.2 K/mcL Final             ASSESSMENT/PLAN  1.   Rheumatoid arthritis doing well labs reviewed stable  Continue methotrexate 15 mg weekly along with daily folic acid    2.  Chronic back pain much better  3.  Renal insufficiency resolved  4.  Health maintenance needs  Prevnar 20 pneumococcal vaccine vaccine    Return to clinic in 4 months       Orders Placed This Encounter   • PNEUMOCOCCAL CONJUGATE 20 VALENT VACC (PREVNAR-20)   • CBC with Automated Differential   • Comprehensive Metabolic Panel   • Sedimentation Rate   • C Reactive Protein   • methotrexate (RHEUMATREX) 2.5 MG tablet         Risks of medical conditions and side effects of medication were discussed.  Medical compliance with plan discussed and risks of non-compliance reviewed.    Patient education completed on disease process, etiology & prognosis.    Patient expresses understanding of the plan.    Return to clinic as clinically indicated as discussed with patient who verbalized understanding of & agreement with the plan.       Juvenal Gar MD

## 2023-12-12 ENCOUNTER — APPOINTMENT (OUTPATIENT)
Dept: CT IMAGING | Age: 58
End: 2023-12-12
Payer: COMMERCIAL

## 2023-12-12 ENCOUNTER — HOSPITAL ENCOUNTER (EMERGENCY)
Age: 58
Discharge: HOME | End: 2023-12-13
Attending: EMERGENCY MEDICINE
Payer: COMMERCIAL

## 2023-12-12 ENCOUNTER — APPOINTMENT (OUTPATIENT)
Dept: GENERAL RADIOLOGY | Age: 58
End: 2023-12-12
Payer: COMMERCIAL

## 2023-12-12 DIAGNOSIS — E87.6 HYPOKALEMIA: ICD-10-CM

## 2023-12-12 DIAGNOSIS — Z86.69 HISTORY OF MIGRAINE: ICD-10-CM

## 2023-12-12 DIAGNOSIS — M62.838 SPASM OF MUSCLE: Primary | ICD-10-CM

## 2023-12-12 DIAGNOSIS — E83.42 HYPOMAGNESEMIA: ICD-10-CM

## 2023-12-12 DIAGNOSIS — R11.2 NAUSEA AND VOMITING, UNSPECIFIED VOMITING TYPE: ICD-10-CM

## 2023-12-12 PROCEDURE — 87636 SARSCOV2 & INF A&B AMP PRB: CPT

## 2023-12-12 PROCEDURE — 99285 EMERGENCY DEPT VISIT HI MDM: CPT

## 2023-12-12 PROCEDURE — 80053 COMPREHEN METABOLIC PANEL: CPT

## 2023-12-12 PROCEDURE — 71275 CT ANGIOGRAPHY CHEST: CPT

## 2023-12-12 PROCEDURE — 93005 ELECTROCARDIOGRAM TRACING: CPT | Performed by: EMERGENCY MEDICINE

## 2023-12-12 PROCEDURE — 83880 ASSAY OF NATRIURETIC PEPTIDE: CPT

## 2023-12-12 PROCEDURE — 6360000004 HC RX CONTRAST MEDICATION: Performed by: EMERGENCY MEDICINE

## 2023-12-12 PROCEDURE — 83735 ASSAY OF MAGNESIUM: CPT

## 2023-12-12 PROCEDURE — 86901 BLOOD TYPING SEROLOGIC RH(D): CPT

## 2023-12-12 PROCEDURE — 6360000002 HC RX W HCPCS: Performed by: EMERGENCY MEDICINE

## 2023-12-12 PROCEDURE — 86850 RBC ANTIBODY SCREEN: CPT

## 2023-12-12 PROCEDURE — 84484 ASSAY OF TROPONIN QUANT: CPT

## 2023-12-12 PROCEDURE — 96375 TX/PRO/DX INJ NEW DRUG ADDON: CPT

## 2023-12-12 PROCEDURE — 85025 COMPLETE CBC W/AUTO DIFF WBC: CPT

## 2023-12-12 PROCEDURE — 71045 X-RAY EXAM CHEST 1 VIEW: CPT

## 2023-12-12 PROCEDURE — 86900 BLOOD TYPING SEROLOGIC ABO: CPT

## 2023-12-12 RX ORDER — FENTANYL CITRATE 50 UG/ML
75 INJECTION, SOLUTION INTRAMUSCULAR; INTRAVENOUS ONCE
Status: COMPLETED | OUTPATIENT
Start: 2023-12-12 | End: 2023-12-12

## 2023-12-12 RX ADMIN — FENTANYL CITRATE 75 MCG: 50 INJECTION INTRAMUSCULAR; INTRAVENOUS at 23:44

## 2023-12-12 RX ADMIN — IOPAMIDOL 75 ML: 755 INJECTION, SOLUTION INTRAVENOUS at 23:32

## 2023-12-12 ASSESSMENT — PAIN SCALES - GENERAL
PAINLEVEL_OUTOF10: 9
PAINLEVEL_OUTOF10: 9

## 2023-12-12 ASSESSMENT — PAIN DESCRIPTION - PAIN TYPE: TYPE: ACUTE PAIN

## 2023-12-12 ASSESSMENT — PAIN DESCRIPTION - DESCRIPTORS: DESCRIPTORS: ACHING

## 2023-12-12 ASSESSMENT — PAIN - FUNCTIONAL ASSESSMENT
PAIN_FUNCTIONAL_ASSESSMENT: 0-10
PAIN_FUNCTIONAL_ASSESSMENT: PREVENTS OR INTERFERES WITH ALL ACTIVE AND SOME PASSIVE ACTIVITIES

## 2023-12-12 ASSESSMENT — PAIN DESCRIPTION - FREQUENCY: FREQUENCY: CONTINUOUS

## 2023-12-12 ASSESSMENT — PAIN DESCRIPTION - ONSET: ONSET: PROGRESSIVE

## 2023-12-12 ASSESSMENT — PAIN DESCRIPTION - LOCATION
LOCATION: BACK
LOCATION: BACK

## 2023-12-12 ASSESSMENT — PAIN DESCRIPTION - ORIENTATION: ORIENTATION: LOWER

## 2023-12-13 ENCOUNTER — APPOINTMENT (OUTPATIENT)
Dept: ULTRASOUND IMAGING | Age: 58
End: 2023-12-13
Payer: COMMERCIAL

## 2023-12-13 ENCOUNTER — HOSPITAL ENCOUNTER (OUTPATIENT)
Age: 58
Setting detail: OBSERVATION
Discharge: HOME | End: 2023-12-16
Attending: EMERGENCY MEDICINE | Admitting: HOSPITALIST
Payer: COMMERCIAL

## 2023-12-13 VITALS
DIASTOLIC BLOOD PRESSURE: 82 MMHG | RESPIRATION RATE: 16 BRPM | TEMPERATURE: 98.7 F | SYSTOLIC BLOOD PRESSURE: 128 MMHG | OXYGEN SATURATION: 99 % | HEART RATE: 68 BPM

## 2023-12-13 DIAGNOSIS — M54.59 INTRACTABLE LOW BACK PAIN: Primary | ICD-10-CM

## 2023-12-13 DIAGNOSIS — E87.6 HYPOKALEMIA: ICD-10-CM

## 2023-12-13 DIAGNOSIS — R10.31 ABDOMINAL PAIN, RIGHT LOWER QUADRANT: ICD-10-CM

## 2023-12-13 DIAGNOSIS — R11.2 NAUSEA AND VOMITING, UNSPECIFIED VOMITING TYPE: ICD-10-CM

## 2023-12-13 DIAGNOSIS — M54.9 INTRACTABLE BACK PAIN: ICD-10-CM

## 2023-12-13 LAB
ABO + RH BLD: NORMAL
ALBUMIN SERPL-MCNC: 3.8 G/DL (ref 3.4–5)
ALBUMIN SERPL-MCNC: 4 G/DL (ref 3.4–5)
ALBUMIN/GLOB SERPL: 1.5 {RATIO} (ref 1.1–2.2)
ALBUMIN/GLOB SERPL: 1.7 {RATIO} (ref 1.1–2.2)
ALP SERPL-CCNC: 61 U/L (ref 40–129)
ALP SERPL-CCNC: 69 U/L (ref 40–129)
ALT SERPL-CCNC: 12 U/L (ref 10–40)
ALT SERPL-CCNC: 16 U/L (ref 10–40)
AMPHETAMINES UR QL SCN>1000 NG/ML: ABNORMAL
ANION GAP SERPL CALCULATED.3IONS-SCNC: 13 MMOL/L (ref 3–16)
ANION GAP SERPL CALCULATED.3IONS-SCNC: 9 MMOL/L (ref 3–16)
AST SERPL-CCNC: 19 U/L (ref 15–37)
AST SERPL-CCNC: 24 U/L (ref 15–37)
BARBITURATES UR QL SCN>200 NG/ML: POSITIVE
BASOPHILS # BLD: 0.1 K/UL (ref 0–0.2)
BASOPHILS # BLD: 0.1 K/UL (ref 0–0.2)
BASOPHILS NFR BLD: 0.5 %
BASOPHILS NFR BLD: 0.5 %
BENZODIAZ UR QL SCN>200 NG/ML: ABNORMAL
BILIRUB SERPL-MCNC: 0.4 MG/DL (ref 0–1)
BILIRUB SERPL-MCNC: 0.6 MG/DL (ref 0–1)
BILIRUB UR QL STRIP.AUTO: NEGATIVE
BILIRUB UR QL STRIP.AUTO: NEGATIVE
BLD GP AB SCN SERPL QL: NORMAL
BUN SERPL-MCNC: 11 MG/DL (ref 7–20)
BUN SERPL-MCNC: 13 MG/DL (ref 7–20)
CALCIUM SERPL-MCNC: 9 MG/DL (ref 8.3–10.6)
CALCIUM SERPL-MCNC: 9.9 MG/DL (ref 8.3–10.6)
CANNABINOIDS UR QL SCN>50 NG/ML: POSITIVE
CHLORIDE SERPL-SCNC: 96 MMOL/L (ref 99–110)
CHLORIDE SERPL-SCNC: 98 MMOL/L (ref 99–110)
CLARITY UR: CLEAR
CLARITY UR: CLEAR
CO2 SERPL-SCNC: 26 MMOL/L (ref 21–32)
CO2 SERPL-SCNC: 26 MMOL/L (ref 21–32)
COCAINE UR QL SCN: ABNORMAL
COLOR UR: YELLOW
COLOR UR: YELLOW
CREAT SERPL-MCNC: 0.8 MG/DL (ref 0.6–1.1)
CREAT SERPL-MCNC: 1.1 MG/DL (ref 0.6–1.1)
DEPRECATED RDW RBC AUTO: 12.6 % (ref 12.4–15.4)
DEPRECATED RDW RBC AUTO: 12.7 % (ref 12.4–15.4)
DRUG SCREEN COMMENT UR-IMP: ABNORMAL
EKG ATRIAL RATE: 62 BPM
EKG DIAGNOSIS: NORMAL
EKG P AXIS: 69 DEGREES
EKG P-R INTERVAL: 168 MS
EKG Q-T INTERVAL: 432 MS
EKG QRS DURATION: 70 MS
EKG QTC CALCULATION (BAZETT): 438 MS
EKG R AXIS: -8 DEGREES
EKG T AXIS: 52 DEGREES
EKG VENTRICULAR RATE: 62 BPM
EOSINOPHIL # BLD: 0.1 K/UL (ref 0–0.6)
EOSINOPHIL # BLD: 0.4 K/UL (ref 0–0.6)
EOSINOPHIL NFR BLD: 1.3 %
EOSINOPHIL NFR BLD: 3.1 %
ERYTHROCYTE [SEDIMENTATION RATE] IN BLOOD BY WESTERGREN METHOD: 10 MM/HR (ref 0–30)
FENTANYL SCREEN, URINE: POSITIVE
FLUAV RNA RESP QL NAA+PROBE: NOT DETECTED
FLUAV RNA RESP QL NAA+PROBE: NOT DETECTED
FLUBV RNA RESP QL NAA+PROBE: NOT DETECTED
FLUBV RNA RESP QL NAA+PROBE: NOT DETECTED
GFR SERPLBLD CREATININE-BSD FMLA CKD-EPI: 58 ML/MIN/{1.73_M2}
GFR SERPLBLD CREATININE-BSD FMLA CKD-EPI: >60 ML/MIN/{1.73_M2}
GLUCOSE SERPL-MCNC: 130 MG/DL (ref 70–99)
GLUCOSE SERPL-MCNC: 133 MG/DL (ref 70–99)
GLUCOSE UR STRIP.AUTO-MCNC: NEGATIVE MG/DL
GLUCOSE UR STRIP.AUTO-MCNC: NEGATIVE MG/DL
HCT VFR BLD AUTO: 39.3 % (ref 36–48)
HCT VFR BLD AUTO: 41.3 % (ref 36–48)
HGB BLD-MCNC: 13.1 G/DL (ref 12–16)
HGB BLD-MCNC: 14 G/DL (ref 12–16)
HGB UR QL STRIP.AUTO: NEGATIVE
HGB UR QL STRIP.AUTO: NEGATIVE
KETONES UR STRIP.AUTO-MCNC: NEGATIVE MG/DL
KETONES UR STRIP.AUTO-MCNC: NEGATIVE MG/DL
LEUKOCYTE ESTERASE UR QL STRIP.AUTO: NEGATIVE
LEUKOCYTE ESTERASE UR QL STRIP.AUTO: NEGATIVE
LYMPHOCYTES # BLD: 1.8 K/UL (ref 1–5.1)
LYMPHOCYTES # BLD: 3.6 K/UL (ref 1–5.1)
LYMPHOCYTES NFR BLD: 16.3 %
LYMPHOCYTES NFR BLD: 29.4 %
MAGNESIUM SERPL-MCNC: 1.5 MG/DL (ref 1.8–2.4)
MAGNESIUM SERPL-MCNC: 1.7 MG/DL (ref 1.8–2.4)
MCH RBC QN AUTO: 30.9 PG (ref 26–34)
MCH RBC QN AUTO: 31.5 PG (ref 26–34)
MCHC RBC AUTO-ENTMCNC: 33.4 G/DL (ref 31–36)
MCHC RBC AUTO-ENTMCNC: 34 G/DL (ref 31–36)
MCV RBC AUTO: 92.5 FL (ref 80–100)
MCV RBC AUTO: 92.8 FL (ref 80–100)
METHADONE UR QL SCN>300 NG/ML: ABNORMAL
MONOCYTES # BLD: 0.6 K/UL (ref 0–1.3)
MONOCYTES # BLD: 0.7 K/UL (ref 0–1.3)
MONOCYTES NFR BLD: 5 %
MONOCYTES NFR BLD: 5.6 %
NEUTROPHILS # BLD: 7.5 K/UL (ref 1.7–7.7)
NEUTROPHILS # BLD: 8.7 K/UL (ref 1.7–7.7)
NEUTROPHILS NFR BLD: 61.4 %
NEUTROPHILS NFR BLD: 76.9 %
NITRITE UR QL STRIP.AUTO: NEGATIVE
NITRITE UR QL STRIP.AUTO: NEGATIVE
NT-PROBNP SERPL-MCNC: 122 PG/ML (ref 0–124)
OPIATES UR QL SCN>300 NG/ML: POSITIVE
OXYCODONE UR QL SCN: ABNORMAL
PCP UR QL SCN>25 NG/ML: ABNORMAL
PH UR STRIP.AUTO: 6.5 [PH] (ref 5–8)
PH UR STRIP.AUTO: 7 [PH] (ref 5–8)
PH UR STRIP: 6.5 [PH]
PLATELET # BLD AUTO: 258 K/UL (ref 135–450)
PLATELET # BLD AUTO: 290 K/UL (ref 135–450)
PMV BLD AUTO: 8.5 FL (ref 5–10.5)
PMV BLD AUTO: 9.4 FL (ref 5–10.5)
POTASSIUM SERPL-SCNC: 2.9 MMOL/L (ref 3.5–5.1)
POTASSIUM SERPL-SCNC: 2.9 MMOL/L (ref 3.5–5.1)
PROT SERPL-MCNC: 6.1 G/DL (ref 6.4–8.2)
PROT SERPL-MCNC: 6.6 G/DL (ref 6.4–8.2)
PROT UR STRIP.AUTO-MCNC: NEGATIVE MG/DL
PROT UR STRIP.AUTO-MCNC: NEGATIVE MG/DL
RBC # BLD AUTO: 4.25 M/UL (ref 4–5.2)
RBC # BLD AUTO: 4.45 M/UL (ref 4–5.2)
SARS-COV-2 RNA RESP QL NAA+PROBE: NOT DETECTED
SARS-COV-2 RNA RESP QL NAA+PROBE: NOT DETECTED
SODIUM SERPL-SCNC: 133 MMOL/L (ref 136–145)
SODIUM SERPL-SCNC: 135 MMOL/L (ref 136–145)
SP GR UR STRIP.AUTO: 1.01 (ref 1–1.03)
SP GR UR STRIP.AUTO: 1.02 (ref 1–1.03)
TROPONIN, HIGH SENSITIVITY: 6 NG/L (ref 0–14)
TROPONIN, HIGH SENSITIVITY: 7 NG/L (ref 0–14)
TROPONIN, HIGH SENSITIVITY: 7 NG/L (ref 0–14)
UA COMPLETE W REFLEX CULTURE PNL UR: NORMAL
UA COMPLETE W REFLEX CULTURE PNL UR: NORMAL
UA DIPSTICK W REFLEX MICRO PNL UR: NORMAL
UA DIPSTICK W REFLEX MICRO PNL UR: NORMAL
URN SPEC COLLECT METH UR: NORMAL
URN SPEC COLLECT METH UR: NORMAL
UROBILINOGEN UR STRIP-ACNC: 0.2 E.U./DL
UROBILINOGEN UR STRIP-ACNC: 0.2 E.U./DL
WBC # BLD AUTO: 11.3 K/UL (ref 4–11)
WBC # BLD AUTO: 12.2 K/UL (ref 4–11)

## 2023-12-13 PROCEDURE — 93975 VASCULAR STUDY: CPT

## 2023-12-13 PROCEDURE — 96375 TX/PRO/DX INJ NEW DRUG ADDON: CPT

## 2023-12-13 PROCEDURE — 80307 DRUG TEST PRSMV CHEM ANLYZR: CPT

## 2023-12-13 PROCEDURE — 84484 ASSAY OF TROPONIN QUANT: CPT

## 2023-12-13 PROCEDURE — 85025 COMPLETE CBC W/AUTO DIFF WBC: CPT

## 2023-12-13 PROCEDURE — 99285 EMERGENCY DEPT VISIT HI MDM: CPT

## 2023-12-13 PROCEDURE — 81003 URINALYSIS AUTO W/O SCOPE: CPT

## 2023-12-13 PROCEDURE — 96376 TX/PRO/DX INJ SAME DRUG ADON: CPT

## 2023-12-13 PROCEDURE — 93010 ELECTROCARDIOGRAM REPORT: CPT | Performed by: INTERNAL MEDICINE

## 2023-12-13 PROCEDURE — 6370000000 HC RX 637 (ALT 250 FOR IP): Performed by: EMERGENCY MEDICINE

## 2023-12-13 PROCEDURE — 96368 THER/DIAG CONCURRENT INF: CPT

## 2023-12-13 PROCEDURE — 76856 US EXAM PELVIC COMPLETE: CPT

## 2023-12-13 PROCEDURE — 87636 SARSCOV2 & INF A&B AMP PRB: CPT

## 2023-12-13 PROCEDURE — G0378 HOSPITAL OBSERVATION PER HR: HCPCS

## 2023-12-13 PROCEDURE — 85652 RBC SED RATE AUTOMATED: CPT

## 2023-12-13 PROCEDURE — 96365 THER/PROPH/DIAG IV INF INIT: CPT

## 2023-12-13 PROCEDURE — 80053 COMPREHEN METABOLIC PANEL: CPT

## 2023-12-13 PROCEDURE — 2580000003 HC RX 258: Performed by: EMERGENCY MEDICINE

## 2023-12-13 PROCEDURE — 6360000002 HC RX W HCPCS: Performed by: EMERGENCY MEDICINE

## 2023-12-13 PROCEDURE — 83735 ASSAY OF MAGNESIUM: CPT

## 2023-12-13 PROCEDURE — 93005 ELECTROCARDIOGRAM TRACING: CPT | Performed by: EMERGENCY MEDICINE

## 2023-12-13 PROCEDURE — 2580000003 HC RX 258: Performed by: NURSE PRACTITIONER

## 2023-12-13 PROCEDURE — 96366 THER/PROPH/DIAG IV INF ADDON: CPT

## 2023-12-13 PROCEDURE — 6370000000 HC RX 637 (ALT 250 FOR IP): Performed by: NURSE PRACTITIONER

## 2023-12-13 RX ORDER — LIDOCAINE 4 G/G
1 PATCH TOPICAL DAILY
Status: DISCONTINUED | OUTPATIENT
Start: 2023-12-14 | End: 2023-12-16 | Stop reason: HOSPADM

## 2023-12-13 RX ORDER — KETOROLAC TROMETHAMINE 10 MG/1
10 TABLET, FILM COATED ORAL EVERY 6 HOURS PRN
Qty: 12 TABLET | Refills: 0 | Status: SHIPPED | OUTPATIENT
Start: 2023-12-13 | End: 2023-12-16

## 2023-12-13 RX ORDER — 0.9 % SODIUM CHLORIDE 0.9 %
500 INTRAVENOUS SOLUTION INTRAVENOUS ONCE
Status: COMPLETED | OUTPATIENT
Start: 2023-12-13 | End: 2023-12-13

## 2023-12-13 RX ORDER — KETOROLAC TROMETHAMINE 10 MG/1
10 TABLET, FILM COATED ORAL EVERY 6 HOURS PRN
Status: DISCONTINUED | OUTPATIENT
Start: 2023-12-13 | End: 2023-12-13

## 2023-12-13 RX ORDER — POTASSIUM CHLORIDE 7.45 MG/ML
10 INJECTION INTRAVENOUS ONCE
Status: COMPLETED | OUTPATIENT
Start: 2023-12-13 | End: 2023-12-13

## 2023-12-13 RX ORDER — TRIAMTERENE AND HYDROCHLOROTHIAZIDE 37.5; 25 MG/1; MG/1
2 TABLET ORAL DAILY
Status: DISCONTINUED | OUTPATIENT
Start: 2023-12-14 | End: 2023-12-16 | Stop reason: HOSPADM

## 2023-12-13 RX ORDER — ONDANSETRON 4 MG/1
4 TABLET, ORALLY DISINTEGRATING ORAL EVERY 8 HOURS PRN
Status: DISCONTINUED | OUTPATIENT
Start: 2023-12-13 | End: 2023-12-16 | Stop reason: HOSPADM

## 2023-12-13 RX ORDER — SODIUM CHLORIDE 0.9 % (FLUSH) 0.9 %
5-40 SYRINGE (ML) INJECTION PRN
Status: DISCONTINUED | OUTPATIENT
Start: 2023-12-13 | End: 2023-12-16 | Stop reason: HOSPADM

## 2023-12-13 RX ORDER — VITS A,C,E/LUTEIN/MINERALS 300MCG-200
400 TABLET ORAL DAILY
Qty: 30 TABLET | Refills: 0 | Status: SHIPPED | OUTPATIENT
Start: 2023-12-13 | End: 2023-12-28

## 2023-12-13 RX ORDER — MAGNESIUM SULFATE IN WATER 40 MG/ML
2000 INJECTION, SOLUTION INTRAVENOUS PRN
Status: DISCONTINUED | OUTPATIENT
Start: 2023-12-13 | End: 2023-12-16 | Stop reason: HOSPADM

## 2023-12-13 RX ORDER — ONDANSETRON 2 MG/ML
4 INJECTION INTRAMUSCULAR; INTRAVENOUS EVERY 6 HOURS PRN
Status: DISCONTINUED | OUTPATIENT
Start: 2023-12-13 | End: 2023-12-16 | Stop reason: HOSPADM

## 2023-12-13 RX ORDER — LIDOCAINE 4 G/G
1 PATCH TOPICAL ONCE
Status: COMPLETED | OUTPATIENT
Start: 2023-12-13 | End: 2023-12-14

## 2023-12-13 RX ORDER — ENOXAPARIN SODIUM 100 MG/ML
40 INJECTION SUBCUTANEOUS DAILY
Status: DISCONTINUED | OUTPATIENT
Start: 2023-12-14 | End: 2023-12-16 | Stop reason: HOSPADM

## 2023-12-13 RX ORDER — HYDROMORPHONE HYDROCHLORIDE 1 MG/ML
0.5 INJECTION, SOLUTION INTRAMUSCULAR; INTRAVENOUS; SUBCUTANEOUS ONCE
Status: COMPLETED | OUTPATIENT
Start: 2023-12-13 | End: 2023-12-13

## 2023-12-13 RX ORDER — POTASSIUM CHLORIDE 7.45 MG/ML
10 INJECTION INTRAVENOUS PRN
Status: DISCONTINUED | OUTPATIENT
Start: 2023-12-13 | End: 2023-12-16 | Stop reason: HOSPADM

## 2023-12-13 RX ORDER — POTASSIUM CHLORIDE 20 MEQ/1
60 TABLET, EXTENDED RELEASE ORAL ONCE
Status: COMPLETED | OUTPATIENT
Start: 2023-12-13 | End: 2023-12-13

## 2023-12-13 RX ORDER — DIAZEPAM 5 MG/1
5 TABLET ORAL ONCE
Status: COMPLETED | OUTPATIENT
Start: 2023-12-13 | End: 2023-12-13

## 2023-12-13 RX ORDER — SODIUM CHLORIDE 9 MG/ML
INJECTION, SOLUTION INTRAVENOUS CONTINUOUS
Status: ACTIVE | OUTPATIENT
Start: 2023-12-13 | End: 2023-12-14

## 2023-12-13 RX ORDER — POLYETHYLENE GLYCOL 3350 17 G/17G
17 POWDER, FOR SOLUTION ORAL DAILY PRN
Status: DISCONTINUED | OUTPATIENT
Start: 2023-12-13 | End: 2023-12-16 | Stop reason: HOSPADM

## 2023-12-13 RX ORDER — IBUPROFEN 400 MG/1
400 TABLET ORAL EVERY 6 HOURS PRN
Status: DISCONTINUED | OUTPATIENT
Start: 2023-12-13 | End: 2023-12-14

## 2023-12-13 RX ORDER — KETOROLAC TROMETHAMINE 30 MG/ML
15 INJECTION, SOLUTION INTRAMUSCULAR; INTRAVENOUS ONCE
Status: COMPLETED | OUTPATIENT
Start: 2023-12-13 | End: 2023-12-13

## 2023-12-13 RX ORDER — MAGNESIUM SULFATE IN WATER 40 MG/ML
2000 INJECTION, SOLUTION INTRAVENOUS ONCE
Status: COMPLETED | OUTPATIENT
Start: 2023-12-13 | End: 2023-12-13

## 2023-12-13 RX ORDER — SODIUM CHLORIDE 9 MG/ML
INJECTION, SOLUTION INTRAVENOUS PRN
Status: DISCONTINUED | OUTPATIENT
Start: 2023-12-13 | End: 2023-12-16 | Stop reason: HOSPADM

## 2023-12-13 RX ORDER — KETOROLAC TROMETHAMINE 30 MG/ML
30 INJECTION, SOLUTION INTRAMUSCULAR; INTRAVENOUS ONCE
Status: COMPLETED | OUTPATIENT
Start: 2023-12-13 | End: 2023-12-13

## 2023-12-13 RX ORDER — METHOCARBAMOL 750 MG/1
750 TABLET, FILM COATED ORAL 4 TIMES DAILY PRN
Status: DISCONTINUED | OUTPATIENT
Start: 2023-12-13 | End: 2023-12-14

## 2023-12-13 RX ORDER — ACETAMINOPHEN 325 MG/1
650 TABLET ORAL EVERY 6 HOURS PRN
Status: DISCONTINUED | OUTPATIENT
Start: 2023-12-13 | End: 2023-12-16 | Stop reason: HOSPADM

## 2023-12-13 RX ORDER — PROPRANOLOL HYDROCHLORIDE 10 MG/1
40 TABLET ORAL 2 TIMES DAILY
Status: DISCONTINUED | OUTPATIENT
Start: 2023-12-14 | End: 2023-12-16 | Stop reason: HOSPADM

## 2023-12-13 RX ORDER — ONDANSETRON 2 MG/ML
4 INJECTION INTRAMUSCULAR; INTRAVENOUS ONCE
Status: COMPLETED | OUTPATIENT
Start: 2023-12-13 | End: 2023-12-13

## 2023-12-13 RX ORDER — MAGNESIUM SULFATE 1 G/100ML
1000 INJECTION INTRAVENOUS ONCE
Status: COMPLETED | OUTPATIENT
Start: 2023-12-13 | End: 2023-12-13

## 2023-12-13 RX ORDER — METHOCARBAMOL 750 MG/1
750 TABLET, FILM COATED ORAL 4 TIMES DAILY
Qty: 40 TABLET | Refills: 0 | Status: ON HOLD | OUTPATIENT
Start: 2023-12-13 | End: 2023-12-15 | Stop reason: HOSPADM

## 2023-12-13 RX ORDER — SODIUM CHLORIDE 0.9 % (FLUSH) 0.9 %
5-40 SYRINGE (ML) INJECTION EVERY 12 HOURS SCHEDULED
Status: DISCONTINUED | OUTPATIENT
Start: 2023-12-13 | End: 2023-12-16 | Stop reason: HOSPADM

## 2023-12-13 RX ORDER — POTASSIUM CHLORIDE 20 MEQ/1
40 TABLET, EXTENDED RELEASE ORAL PRN
Status: DISCONTINUED | OUTPATIENT
Start: 2023-12-13 | End: 2023-12-16 | Stop reason: HOSPADM

## 2023-12-13 RX ORDER — ACETAMINOPHEN 650 MG/1
650 SUPPOSITORY RECTAL EVERY 6 HOURS PRN
Status: DISCONTINUED | OUTPATIENT
Start: 2023-12-13 | End: 2023-12-16 | Stop reason: HOSPADM

## 2023-12-13 RX ADMIN — ONDANSETRON 4 MG: 2 INJECTION INTRAMUSCULAR; INTRAVENOUS at 02:06

## 2023-12-13 RX ADMIN — MAGNESIUM SULFATE HEPTAHYDRATE 1000 MG: 1 INJECTION, SOLUTION INTRAVENOUS at 20:33

## 2023-12-13 RX ADMIN — KETOROLAC TROMETHAMINE 15 MG: 30 INJECTION, SOLUTION INTRAMUSCULAR at 20:07

## 2023-12-13 RX ADMIN — ONDANSETRON 4 MG: 2 INJECTION INTRAMUSCULAR; INTRAVENOUS at 17:33

## 2023-12-13 RX ADMIN — POTASSIUM CHLORIDE 10 MEQ: 7.46 INJECTION, SOLUTION INTRAVENOUS at 19:22

## 2023-12-13 RX ADMIN — SODIUM CHLORIDE 500 ML: 9 INJECTION, SOLUTION INTRAVENOUS at 17:50

## 2023-12-13 RX ADMIN — POTASSIUM CHLORIDE 60 MEQ: 1500 TABLET, EXTENDED RELEASE ORAL at 19:23

## 2023-12-13 RX ADMIN — MAGNESIUM SULFATE HEPTAHYDRATE 2000 MG: 40 INJECTION, SOLUTION INTRAVENOUS at 00:42

## 2023-12-13 RX ADMIN — POTASSIUM BICARBONATE 20 MEQ: 782 TABLET, EFFERVESCENT ORAL at 00:39

## 2023-12-13 RX ADMIN — POTASSIUM BICARBONATE 40 MEQ: 782 TABLET, EFFERVESCENT ORAL at 23:36

## 2023-12-13 RX ADMIN — IBUPROFEN 400 MG: 400 TABLET, FILM COATED ORAL at 23:36

## 2023-12-13 RX ADMIN — SODIUM CHLORIDE: 9 INJECTION, SOLUTION INTRAVENOUS at 22:37

## 2023-12-13 RX ADMIN — HYDROMORPHONE HYDROCHLORIDE 0.5 MG: 1 INJECTION, SOLUTION INTRAMUSCULAR; INTRAVENOUS; SUBCUTANEOUS at 17:34

## 2023-12-13 RX ADMIN — DIAZEPAM 5 MG: 5 TABLET ORAL at 17:41

## 2023-12-13 RX ADMIN — SODIUM CHLORIDE 500 ML: 9 INJECTION, SOLUTION INTRAVENOUS at 19:19

## 2023-12-13 RX ADMIN — POTASSIUM CHLORIDE 10 MEQ: 7.46 INJECTION, SOLUTION INTRAVENOUS at 00:51

## 2023-12-13 RX ADMIN — HYDROMORPHONE HYDROCHLORIDE 0.5 MG: 1 INJECTION, SOLUTION INTRAMUSCULAR; INTRAVENOUS; SUBCUTANEOUS at 20:07

## 2023-12-13 RX ADMIN — KETOROLAC TROMETHAMINE 30 MG: 30 INJECTION, SOLUTION INTRAMUSCULAR at 02:05

## 2023-12-13 SDOH — ECONOMIC STABILITY: TRANSPORTATION INSECURITY
IN THE PAST 12 MONTHS, HAS LACK OF TRANSPORTATION KEPT YOU FROM MEETINGS, WORK, OR FROM GETTING THINGS NEEDED FOR DAILY LIVING?: NO

## 2023-12-13 SDOH — ECONOMIC STABILITY: HOUSING INSECURITY: IN THE LAST 12 MONTHS, HOW MANY PLACES HAVE YOU LIVED?: 1

## 2023-12-13 SDOH — ECONOMIC STABILITY: FOOD INSECURITY: WITHIN THE PAST 12 MONTHS, YOU WORRIED THAT YOUR FOOD WOULD RUN OUT BEFORE YOU GOT MONEY TO BUY MORE.: NEVER TRUE

## 2023-12-13 SDOH — ECONOMIC STABILITY: INCOME INSECURITY: HOW HARD IS IT FOR YOU TO PAY FOR THE VERY BASICS LIKE FOOD, HOUSING, MEDICAL CARE, AND HEATING?: NOT HARD AT ALL

## 2023-12-13 SDOH — ECONOMIC STABILITY: TRANSPORTATION INSECURITY
IN THE PAST 12 MONTHS, HAS THE LACK OF TRANSPORTATION KEPT YOU FROM MEDICAL APPOINTMENTS OR FROM GETTING MEDICATIONS?: NO

## 2023-12-13 SDOH — ECONOMIC STABILITY: INCOME INSECURITY: IN THE LAST 12 MONTHS, WAS THERE A TIME WHEN YOU WERE NOT ABLE TO PAY THE MORTGAGE OR RENT ON TIME?: NO

## 2023-12-13 SDOH — ECONOMIC STABILITY: FOOD INSECURITY: WITHIN THE PAST 12 MONTHS, THE FOOD YOU BOUGHT JUST DIDN'T LAST AND YOU DIDN'T HAVE MONEY TO GET MORE.: NEVER TRUE

## 2023-12-13 SDOH — ECONOMIC STABILITY: HOUSING INSECURITY
IN THE LAST 12 MONTHS, WAS THERE A TIME WHEN YOU DID NOT HAVE A STEADY PLACE TO SLEEP OR SLEPT IN A SHELTER (INCLUDING NOW)?: NO

## 2023-12-13 ASSESSMENT — PAIN - FUNCTIONAL ASSESSMENT
PAIN_FUNCTIONAL_ASSESSMENT: 0-10

## 2023-12-13 ASSESSMENT — PAIN DESCRIPTION - LOCATION
LOCATION: FLANK
LOCATION: FLANK
LOCATION: BACK
LOCATION: BACK

## 2023-12-13 ASSESSMENT — PAIN SCALES - GENERAL
PAINLEVEL_OUTOF10: 4
PAINLEVEL_OUTOF10: 8
PAINLEVEL_OUTOF10: 9
PAINLEVEL_OUTOF10: 6
PAINLEVEL_OUTOF10: 10
PAINLEVEL_OUTOF10: 5

## 2023-12-13 ASSESSMENT — PAIN DESCRIPTION - ORIENTATION: ORIENTATION: LOWER

## 2023-12-13 NOTE — ED PROVIDER NOTES
EMERGENCY DEPARTMENT ENCOUNTER        Pt Name: Gemini Carrillo  MRN: [id number]  [address] 1965  Date of evaluation: 12/13/2023  Provider: Taylor Henderson MD  PCP: MAY Garcia CNP      CHIEF COMPLAINT       Chief Complaint   Patient presents with    Back Pain     Pt states \"I have right side back pain started last night/I was seen in ED last night for same thing/I was given meds to take still not helping\"       HISTORY OFPRESENT ILLNESS   (Location/Symptom, Timing/Onset, Context/Setting, Quality, Duration, Modifying Factors,Severity)  Note limiting factors. Gemini Carrillo is a 60 y.o. female presenting today due to concern for developing severe low back pain more on the right side that wraps around to her abdomen starting yesterday morning at which point she ultimately was seen in the emergency department yesterday and had a CTA of the chest/abdomen/pelvis done showing no significant findings and ultimately was discharged. She reports that she tried to sleep overnight after getting home but was still in significant discomfort and was in so much pain at times where she felt so nauseated where she could vomit. She denies any chest pain or shortness of breath. No falls or trauma. She has a history of back surgery but this feels different. She denies any known injuries. No fever. She also was concerned that her potassium was low yesterday and she reports having replacement in the ED but was told that maybe this could cause her pain as well. She denies any urinary complaints. Due to having severe back pain since yesterday and not having any relief at home, she return to the emergency department for repeat evaluation. She denies ever having pain like this before with her back. She denies any weakness in the legs. REVIEW OF SYSTEMS    (2-9 systems for level 4, 10 or more for level 5)     Review of Systems   Constitutional:  Negative for chills and fever.    Respiratory:  Negative for shortness of breath. Cardiovascular:  Negative for chest pain. Gastrointestinal:  Positive for abdominal pain, nausea and vomiting. Genitourinary:  Negative for difficulty urinating, dysuria and flank pain. Musculoskeletal:  Positive for back pain (lower, more on right). Skin:  Negative for wound. Neurological:  Negative for weakness and numbness. Psychiatric/Behavioral:  The patient is nervous/anxious. Positives and Pertinent negatives as per HPI.       PASTMEDICAL HISTORY     Past Medical History:   Diagnosis Date    Arthritis     Diverticulosis 06/01/2020    Environmental allergies     Hypertension          SURGICAL HISTORY       Past Surgical History:   Procedure Laterality Date    BACK SURGERY      times 2    CHOLECYSTECTOMY, LAPAROSCOPIC  5-    Laparoscopic cholecystectomy with intraoperative cholangiogram    COLONOSCOPY  1/29/16    FINGER SURGERY Left     thumb trigger finger    TUBAL LIGATION      WISDOM TOOTH EXTRACTION           CURRENT MEDICATIONS       Current Discharge Medication List        CONTINUE these medications which have NOT CHANGED    Details   potassium bicarbonate (EFFER-K) 25 MEQ disintegrating tablet Take 2 tablets by mouth daily for 15 days  Qty: 30 tablet, Refills: 0      Magnesium Oxide -Mg Supplement (MAG-OXIDE) 200 MG TABS Take 400 mg by mouth daily for 15 days  Qty: 30 tablet, Refills: 0      methocarbamol (ROBAXIN-750) 750 MG tablet Take 1 tablet by mouth 4 times daily for 10 days  Qty: 40 tablet, Refills: 0      ketorolac (TORADOL) 10 MG tablet Take 1 tablet by mouth every 6 hours as needed for Pain TAKE WITH FOOD  Qty: 12 tablet, Refills: 0      meloxicam (MOBIC) 15 MG tablet Take 15 mg by mouth daily      butalbital-acetaminophen-caffeine (FIORICET, ESGIC) -40 MG per tablet Take 1 tablet by mouth every 4 hours as needed for Headaches      dexamethasone (DECADRON) 4 MG/ML injection 1 mL by Other route as needed (by physical therapist) To be

## 2023-12-13 NOTE — ED NOTES
Lab called with critical K+ of 2.9, Dr. Van Dixon notified.      Bereket Camarillo RN  12/13/23 0829

## 2023-12-13 NOTE — ED PROVIDER NOTES
Emergency Department Attending Physician Note  Location: [address] [place] [address]  ED  12/12/2023       Pt Name: Keerthi Chaney  MRN: [id number]  [address] [facility] 1965    Date of evaluation: 12/12/2023  Provider: Ev Saeed DO  PCP: MAY Deal - CNP    Note Started: 11:19 PM EST 12/12/23    CHIEF COMPLAINT  Chief Complaint   Patient presents with    Back Pain     +blurry vision since yesterday       HISTORY OF PRESENT ILLNESS:  History obtained by patient. Limitations to history : None. Keerthi Chaney is a 60 y.o. female with a significant PMHx of what she describes as uncontrolled hypertension, migraines, tobacco smoking, and other comorbidities as listed below, presenting emergency department today with concerns of sudden onset right flank pain that says when she was just at home laying around this evening. She then immediately felt some chest pain and middle of her chest, does not radiate to her back. She tried to stand up, because immediately also felt like she had to vomit, animals passed out when she was on her way to the bathroom. She says she has some blurry vision. She does have a history of migraines, and she thought she had the start of a migraine yesterday, but says the blurry vision is somewhat worse right now. She says \"it feels like a little bit of a strobe light, the lights are really bright. \"  She says she can actually see, but everything is very sensitive. Otherwise, patient says \"I have that thing in my chest you know, that thoracic aortic aneurysm,\" and says it was less than 5 cm, but \"they did not do surgery or anything right now. Denies any numbness or tingling in her hands, but is writhing around in pain here in the ED. No history of kidney stones or ACS that she knows of. The worse pain is in her left back. At this piece of history-- chest pain, lightheadedness, blurry vision, and sudden onset right back pain, near syncope; ordered emergent CTA CAP aorta.  She does

## 2023-12-13 NOTE — ED NOTES
Critical lab results from lab. Pt Potassium 2.9. Provider made aware.      Josshyla Morris, GILDA  91/03/01 [id number]

## 2023-12-14 ENCOUNTER — APPOINTMENT (OUTPATIENT)
Dept: MRI IMAGING | Age: 58
End: 2023-12-14
Payer: COMMERCIAL

## 2023-12-14 LAB
ANION GAP SERPL CALCULATED.3IONS-SCNC: 6 MMOL/L (ref 3–16)
BASOPHILS # BLD: 0 K/UL (ref 0–0.2)
BASOPHILS NFR BLD: 0.2 %
BUN SERPL-MCNC: 9 MG/DL (ref 7–20)
CALCIUM SERPL-MCNC: 9 MG/DL (ref 8.3–10.6)
CHLORIDE SERPL-SCNC: 103 MMOL/L (ref 99–110)
CO2 SERPL-SCNC: 25 MMOL/L (ref 21–32)
CREAT SERPL-MCNC: 0.7 MG/DL (ref 0.6–1.1)
DEPRECATED RDW RBC AUTO: 12.9 % (ref 12.4–15.4)
EKG ATRIAL RATE: 60 BPM
EKG DIAGNOSIS: NORMAL
EKG P AXIS: 60 DEGREES
EKG P-R INTERVAL: 172 MS
EKG Q-T INTERVAL: 438 MS
EKG QRS DURATION: 68 MS
EKG QTC CALCULATION (BAZETT): 438 MS
EKG R AXIS: -9 DEGREES
EKG T AXIS: 33 DEGREES
EKG VENTRICULAR RATE: 60 BPM
EOSINOPHIL # BLD: 0 K/UL (ref 0–0.6)
EOSINOPHIL NFR BLD: 0.3 %
GFR SERPLBLD CREATININE-BSD FMLA CKD-EPI: >60 ML/MIN/{1.73_M2}
GLUCOSE SERPL-MCNC: 153 MG/DL (ref 70–99)
HCT VFR BLD AUTO: 39.8 % (ref 36–48)
HGB BLD-MCNC: 13.3 G/DL (ref 12–16)
LYMPHOCYTES # BLD: 1 K/UL (ref 1–5.1)
LYMPHOCYTES NFR BLD: 11.5 %
MCH RBC QN AUTO: 31.4 PG (ref 26–34)
MCHC RBC AUTO-ENTMCNC: 33.5 G/DL (ref 31–36)
MCV RBC AUTO: 93.8 FL (ref 80–100)
MONOCYTES # BLD: 0.2 K/UL (ref 0–1.3)
MONOCYTES NFR BLD: 1.9 %
NEUTROPHILS # BLD: 7.7 K/UL (ref 1.7–7.7)
NEUTROPHILS NFR BLD: 86.1 %
PLATELET # BLD AUTO: 246 K/UL (ref 135–450)
PMV BLD AUTO: 9.1 FL (ref 5–10.5)
POTASSIUM SERPL-SCNC: 4 MMOL/L (ref 3.5–5.1)
RBC # BLD AUTO: 4.24 M/UL (ref 4–5.2)
SODIUM SERPL-SCNC: 134 MMOL/L (ref 136–145)
WBC # BLD AUTO: 8.9 K/UL (ref 4–11)

## 2023-12-14 PROCEDURE — 36415 COLL VENOUS BLD VENIPUNCTURE: CPT

## 2023-12-14 PROCEDURE — 6370000000 HC RX 637 (ALT 250 FOR IP): Performed by: NURSE PRACTITIONER

## 2023-12-14 PROCEDURE — G0378 HOSPITAL OBSERVATION PER HR: HCPCS

## 2023-12-14 PROCEDURE — 80048 BASIC METABOLIC PNL TOTAL CA: CPT

## 2023-12-14 PROCEDURE — 2580000003 HC RX 258: Performed by: NURSE PRACTITIONER

## 2023-12-14 PROCEDURE — 93010 ELECTROCARDIOGRAM REPORT: CPT | Performed by: INTERNAL MEDICINE

## 2023-12-14 PROCEDURE — 72148 MRI LUMBAR SPINE W/O DYE: CPT

## 2023-12-14 PROCEDURE — 6370000000 HC RX 637 (ALT 250 FOR IP): Performed by: INTERNAL MEDICINE

## 2023-12-14 PROCEDURE — 97165 OT EVAL LOW COMPLEX 30 MIN: CPT

## 2023-12-14 PROCEDURE — 97535 SELF CARE MNGMENT TRAINING: CPT

## 2023-12-14 PROCEDURE — 97162 PT EVAL MOD COMPLEX 30 MIN: CPT

## 2023-12-14 PROCEDURE — 6360000002 HC RX W HCPCS: Performed by: NURSE PRACTITIONER

## 2023-12-14 PROCEDURE — 97116 GAIT TRAINING THERAPY: CPT

## 2023-12-14 PROCEDURE — 85025 COMPLETE CBC W/AUTO DIFF WBC: CPT

## 2023-12-14 RX ORDER — OXYCODONE HYDROCHLORIDE AND ACETAMINOPHEN 5; 325 MG/1; MG/1
1 TABLET ORAL EVERY 6 HOURS PRN
Status: DISCONTINUED | OUTPATIENT
Start: 2023-12-14 | End: 2023-12-15

## 2023-12-14 RX ORDER — METHYLPREDNISOLONE 4 MG/1
4 TABLET ORAL
Status: DISCONTINUED | OUTPATIENT
Start: 2023-12-15 | End: 2023-12-16 | Stop reason: HOSPADM

## 2023-12-14 RX ORDER — IBUPROFEN 600 MG/1
600 TABLET ORAL
Status: DISCONTINUED | OUTPATIENT
Start: 2023-12-14 | End: 2023-12-16 | Stop reason: HOSPADM

## 2023-12-14 RX ORDER — GUAIFENESIN 600 MG/1
600 TABLET, EXTENDED RELEASE ORAL 2 TIMES DAILY
Status: DISCONTINUED | OUTPATIENT
Start: 2023-12-14 | End: 2023-12-16 | Stop reason: HOSPADM

## 2023-12-14 RX ORDER — METHOCARBAMOL 750 MG/1
750 TABLET, FILM COATED ORAL 4 TIMES DAILY
Status: DISCONTINUED | OUTPATIENT
Start: 2023-12-14 | End: 2023-12-15

## 2023-12-14 RX ORDER — HYDROMORPHONE HYDROCHLORIDE 1 MG/ML
0.5 INJECTION, SOLUTION INTRAMUSCULAR; INTRAVENOUS; SUBCUTANEOUS ONCE
Status: COMPLETED | OUTPATIENT
Start: 2023-12-14 | End: 2023-12-14

## 2023-12-14 RX ORDER — METHYLPREDNISOLONE 4 MG/1
4 TABLET ORAL NIGHTLY
Status: DISCONTINUED | OUTPATIENT
Start: 2023-12-16 | End: 2023-12-16 | Stop reason: HOSPADM

## 2023-12-14 RX ORDER — DIAZEPAM 5 MG/1
5 TABLET ORAL EVERY 6 HOURS PRN
Status: COMPLETED | OUTPATIENT
Start: 2023-12-14 | End: 2023-12-14

## 2023-12-14 RX ORDER — METHYLPREDNISOLONE 4 MG/1
24 TABLET ORAL ONCE
Status: COMPLETED | OUTPATIENT
Start: 2023-12-14 | End: 2023-12-14

## 2023-12-14 RX ORDER — KETOROLAC TROMETHAMINE 30 MG/ML
30 INJECTION, SOLUTION INTRAMUSCULAR; INTRAVENOUS EVERY 6 HOURS PRN
Status: DISCONTINUED | OUTPATIENT
Start: 2023-12-14 | End: 2023-12-16 | Stop reason: HOSPADM

## 2023-12-14 RX ORDER — METHYLPREDNISOLONE 4 MG/1
8 TABLET ORAL NIGHTLY
Status: COMPLETED | OUTPATIENT
Start: 2023-12-15 | End: 2023-12-15

## 2023-12-14 RX ADMIN — METHOCARBAMOL 750 MG: 750 TABLET ORAL at 12:42

## 2023-12-14 RX ADMIN — ENOXAPARIN SODIUM 40 MG: 100 INJECTION SUBCUTANEOUS at 09:11

## 2023-12-14 RX ADMIN — GUAIFENESIN 600 MG: 600 TABLET ORAL at 20:04

## 2023-12-14 RX ADMIN — GUAIFENESIN 600 MG: 600 TABLET ORAL at 09:13

## 2023-12-14 RX ADMIN — METHOCARBAMOL 750 MG: 750 TABLET ORAL at 20:04

## 2023-12-14 RX ADMIN — IBUPROFEN 400 MG: 400 TABLET, FILM COATED ORAL at 04:43

## 2023-12-14 RX ADMIN — GUAIFENESIN 600 MG: 600 TABLET ORAL at 01:12

## 2023-12-14 RX ADMIN — POTASSIUM BICARBONATE 40 MEQ: 782 TABLET, EFFERVESCENT ORAL at 09:14

## 2023-12-14 RX ADMIN — KETOROLAC TROMETHAMINE 30 MG: 30 INJECTION, SOLUTION INTRAMUSCULAR; INTRAVENOUS at 01:54

## 2023-12-14 RX ADMIN — OXYCODONE AND ACETAMINOPHEN 1 TABLET: 5; 325 TABLET ORAL at 12:34

## 2023-12-14 RX ADMIN — Medication 10 ML: at 17:44

## 2023-12-14 RX ADMIN — METHOCARBAMOL 750 MG: 750 TABLET ORAL at 16:59

## 2023-12-14 RX ADMIN — METHYLPREDNISOLONE 24 MG: 4 TABLET ORAL at 01:12

## 2023-12-14 RX ADMIN — DIAZEPAM 5 MG: 5 TABLET ORAL at 00:56

## 2023-12-14 RX ADMIN — METHOCARBAMOL 750 MG: 750 TABLET ORAL at 04:43

## 2023-12-14 RX ADMIN — POTASSIUM BICARBONATE 40 MEQ: 782 TABLET, EFFERVESCENT ORAL at 20:04

## 2023-12-14 RX ADMIN — KETOROLAC TROMETHAMINE 30 MG: 30 INJECTION, SOLUTION INTRAMUSCULAR; INTRAVENOUS at 23:58

## 2023-12-14 RX ADMIN — KETOROLAC TROMETHAMINE 30 MG: 30 INJECTION, SOLUTION INTRAMUSCULAR; INTRAVENOUS at 17:44

## 2023-12-14 RX ADMIN — KETOROLAC TROMETHAMINE 30 MG: 30 INJECTION, SOLUTION INTRAMUSCULAR; INTRAVENOUS at 11:29

## 2023-12-14 RX ADMIN — HYDROMORPHONE HYDROCHLORIDE 0.5 MG: 1 INJECTION, SOLUTION INTRAMUSCULAR; INTRAVENOUS; SUBCUTANEOUS at 00:56

## 2023-12-14 RX ADMIN — OXYCODONE AND ACETAMINOPHEN 1 TABLET: 5; 325 TABLET ORAL at 19:04

## 2023-12-14 RX ADMIN — DIAZEPAM 5 MG: 5 TABLET ORAL at 16:52

## 2023-12-14 RX ADMIN — DIAZEPAM 5 MG: 5 TABLET ORAL at 07:25

## 2023-12-14 ASSESSMENT — PAIN DESCRIPTION - FREQUENCY
FREQUENCY: CONTINUOUS

## 2023-12-14 ASSESSMENT — PAIN DESCRIPTION - DESCRIPTORS
DESCRIPTORS: ACHING;DISCOMFORT
DESCRIPTORS: ACHING;DISCOMFORT
DESCRIPTORS: ACHING
DESCRIPTORS: ACHING

## 2023-12-14 ASSESSMENT — PAIN - FUNCTIONAL ASSESSMENT
PAIN_FUNCTIONAL_ASSESSMENT: PREVENTS OR INTERFERES SOME ACTIVE ACTIVITIES AND ADLS
PAIN_FUNCTIONAL_ASSESSMENT: ACTIVITIES ARE NOT PREVENTED
PAIN_FUNCTIONAL_ASSESSMENT: ACTIVITIES ARE NOT PREVENTED

## 2023-12-14 ASSESSMENT — PAIN SCALES - GENERAL
PAINLEVEL_OUTOF10: 9
PAINLEVEL_OUTOF10: 9
PAINLEVEL_OUTOF10: 8
PAINLEVEL_OUTOF10: 8
PAINLEVEL_OUTOF10: 3
PAINLEVEL_OUTOF10: 8
PAINLEVEL_OUTOF10: 10
PAINLEVEL_OUTOF10: 10
PAINLEVEL_OUTOF10: 5
PAINLEVEL_OUTOF10: 7
PAINLEVEL_OUTOF10: 9
PAINLEVEL_OUTOF10: 8
PAINLEVEL_OUTOF10: 8

## 2023-12-14 ASSESSMENT — PAIN DESCRIPTION - PAIN TYPE
TYPE: ACUTE PAIN

## 2023-12-14 ASSESSMENT — PAIN DESCRIPTION - ORIENTATION
ORIENTATION: RIGHT;LOWER
ORIENTATION: LOWER
ORIENTATION: LOWER
ORIENTATION: RIGHT;LOWER

## 2023-12-14 ASSESSMENT — PAIN DESCRIPTION - LOCATION
LOCATION: BACK

## 2023-12-14 ASSESSMENT — PAIN DESCRIPTION - ONSET
ONSET: ON-GOING

## 2023-12-14 NOTE — PROGRESS NOTES
This RN went into patients room as patient was very upset, crying and saying into simms that she was going to leave AMA. This RN was not patient's nurse but asked patient what was wrong and what she could do for her. Patient stated that her pain was a 15/10 and that no pain medicine had been ordered for her. Also stated that she couldn't breathe because of nasal congestion. Previous RN messaged NP for decongestant and pain medicine. Orders placed, see MAR. Patient was still going to leave AMA because she felt she was being categorized as \"drug seeking\". This RN explained to patient that pain medicine had been ordered and that it would be in her best interest to stay to receive her MRI and manage her pain. Patient agreed. Patient given meds with a fair amount of relief and she stated that while she was still hurting, she felt much better and requested that this RN continue her care. Patient updated on plan of care and all needs fulfilled at this time. No neuro deficits, VSS, alert and oriented. Voiding adequately. Fall precautions in place.

## 2023-12-14 NOTE — H&P
mouth daily. Provider, MD Miles     Labs: Personally reviewed and interpreted for clinical significance. Recent Labs     12/12/23 2321 12/13/23 1745   WBC 12.2* 11.3*   HGB 14.0 13.1   HCT 41.3 39.3    258     Recent Labs     12/12/23 2321 12/13/23 1745   * 133*   K 2.9* 2.9*   CL 96* 98*   CO2 26 26   BUN 13 11   CREATININE 1.1 0.8   CALCIUM 9.9 9.0   MG 1.50* 1.70*     Recent Labs     12/12/23 2321 12/13/23  0059 12/13/23  1745   PROBNP 122  --   --    TROPHS 6 7 7     Recent Labs     12/12/23 2321 12/13/23 1745   AST 24 19   ALT 16 12   BILITOT 0.4 0.6   ALKPHOS 69 61     Thank you MAY Rey CNP for the opportunity to be involved in this patient's care. If you have any questions or concerns please feel free to contact me at [phone].      MAY Zimmerman CNP  ---------------------Anticipated Dr. Leah lincoln----------------------

## 2023-12-14 NOTE — PROGRESS NOTES
Occupational Therapy  Facility/Department: [place] [address] - MED SURG/ORTHO  Occupational Therapy Initial Assessment and Treatment Note     Name: Osbaldo Taylor  : 1965  MRN: [id number]  Date of Service: 2023    Discharge Recommendations:  24 hour supervision or assist      Patient Diagnosis(es): The primary encounter diagnosis was Intractable low back pain. Diagnoses of Abdominal pain, right lower quadrant, Nausea and vomiting, unspecified vomiting type, and Hypokalemia were also pertinent to this visit. Past Medical History:  has a past medical history of Arthritis, Diverticulosis, Environmental allergies, and Hypertension. Past Surgical History:  has a past surgical history that includes back surgery; Tubal ligation; Muir tooth extraction; Cholecystectomy, laparoscopic (2013); Finger surgery (Left); and Colonoscopy (16). Assessment   Performance deficits / Impairments: Decreased functional mobility ; Decreased ADL status  After evaluation, pt found to be presenting with the above mentioned occupational performance deficits which are affecting participation in daily living skills. Pt would benefit from continued skilled occupational therapy to address ADLs, functional mobility, and safety while in acute care.   Prognosis: Good  Decision Making: Medium Complexity  REQUIRES OT FOLLOW-UP: Yes  Activity Tolerance  Activity Tolerance: Patient limited by pain        Plan   Occupational Therapy Plan  Times Per Week: 2-3x  Current Treatment Recommendations: Self-Care / ADL, Patient/Caregiver education & training, Equipment evaluation, education, & procurement, Balance training, Functional mobility training     Restrictions  Restrictions/Precautions  Restrictions/Precautions: Fall Risk, General Precautions  Position Activity Restriction  Other position/activity restrictions: telemetry, up as tolerated    Subjective   General  Chart Reviewed: Yes  Patient assessed for rehabilitation services?: Skilled Clinical Factors: Mod A needed for pericare d/t pain. Pt able to manage clothing  Functional Mobility: Contact guard assistance  Functional Mobility Skilled Clinical Factors: CGA for BR mobility without device. RW was recommended to pt but requested to try to walk without it. Pt is guarded with RLE during ambulation and feels that RLE will \"give out\" on her. RW is in her room for pt to use if needed  Additional Comments: Lack of standing tolerance during ADLs d/t RLE pain. Bed mobility  Supine to Sit: Supervision (from R sidelying)  Sit to Supine: Supervision  Transfers  Stand Pivot Transfers: Stand by assistance (to bed)  Sit to stand: Stand by assistance;Contact guard assistance  Stand to sit: Stand by assistance;Contact guard assistance  Transfer Comments: Pt c/o's immediate pain when she transitions from R sidelying to sitting and then standing. Vision  Vision: Within Functional Limits  Hearing  Hearing: Within functional limits  Cognition  Overall Cognitive Status: WNL  Orientation  Overall Orientation Status: Within Normal Limits         Education Given To: Patient  Education Provided: Role of Therapy;Transfer Training;Equipment;ADL Adaptive Strategies; Fall Prevention Strategies  Education Method: Verbal  Barriers to Learning: None  Education Outcome: Verbalized understanding;Continued education needed      Disease Specific Education: Pt educated on importance of OOB mobility, prevention of complications of bedrest, and general safety during hospitalization.  Pt verbalized understanding    AM-PAC - ADL  AM-PAC Daily Activity - Inpatient   How much help is needed for putting on and taking off regular lower body clothing?: A Lot  How much help is needed for bathing (which includes washing, rinsing, drying)?: A Lot  How much help is needed for toileting (which includes using toilet, bedpan, or urinal)?: A Lot  How much help is needed for putting on and taking off regular upper body clothing?:

## 2023-12-14 NOTE — PROGRESS NOTES
Patient very upset this AM, crying. This RN asked why patient was upset and she stated that she was frustrated that her IV pole was beeping and \"no one is listening to her\". This RN offered to change patient's IV to different location from the [facility] last night and patient declined. Patient continues to bend arm kinking IV line despite education. Patient given valium and fresh drink and asked if any other needs to could be met and she declined. Report given to oncoming RN with no further questions.

## 2023-12-14 NOTE — PROGRESS NOTES
4 Eyes Skin Assessment     NAME:  Amrita Brewer  YOB: 1965  MEDICAL RECORD NUMBER:  [id number]    The patient is being assessed for  Admission    I agree that at least one RN has performed a thorough Head to Toe Skin Assessment on the patient. ALL assessment sites listed below have been assessed. Areas assessed by both nurses:    Head, Face, Ears, Shoulders, Back, Chest, Arms, Elbows, Hands, Sacrum. Buttock, Coccyx, Ischium, Legs. Feet and Heels, and Under Medical Devices         Does the Patient have a Wound?  No noted wound(s)       Anthony Prevention initiated by RN: No  Wound Care Orders initiated by RN: No    Pressure Injury (Stage 3,4, Unstageable, DTI, NWPT, and Complex wounds) if present, place Wound referral order by RN under : No    New Ostomies, if present place, Ostomy referral order under : No     Nurse 1 eSignature: Electronically signed by Jessica Ruiz RN on 12/13/23 at 11:32 PM EST    **SHARE this note so that the co-signing nurse can place an eSignature**    Nurse 2 eSignature: {Esignature:[id number]}

## 2023-12-14 NOTE — PLAN OF CARE
Problem: Pain  Goal: Verbalizes/displays adequate comfort level or baseline comfort level  12/14/2023 0211 by Hannah Pina RN  Outcome: Progressing   Numeric pain rating scale being used. Patient repositioned for comfort. Patient is tolerating IV pain medicine. Problem: Safety - Adult  Goal: Free from fall injury  Outcome: Progressing   Patient has remained free of falls. 2/4 bed rails up, bed locked and in lowest position, call light within reach. Patient instructed on use of call light and uses appropriately. Bed alarm on. Non-skid footwear and fall band on.

## 2023-12-14 NOTE — CARE COORDINATION
Writer completed assessment at bedside w/pt and family. Pt reported she is IPTA w/spouse. Pt has a active PCP and insurance. Writer noted positive drug screen, attempted to provide drug resource folder, at that time pt became verbally aggressive stating \"I don't f'''''' need that. Its this places fault I have it. I'm leaving AMA since everyone thinks Im a f'''''' drug addict\". Writer attempted to explain that she will speak w/PCP to evaluate lab testing and that the folder is only a resource, pt cont to yell at [employer]. Also made pt aware that it is very imperative that she have MRI completed prior to d/c. Charge Nurse and Pts RN notified. Will follow for needs as they arise.  Electronically signed by Onel Quinn RN on 12/14/2023 at 11:15 AM

## 2023-12-15 ENCOUNTER — APPOINTMENT (OUTPATIENT)
Dept: GENERAL RADIOLOGY | Age: 58
End: 2023-12-15
Payer: COMMERCIAL

## 2023-12-15 PROCEDURE — 2580000003 HC RX 258: Performed by: NURSE PRACTITIONER

## 2023-12-15 PROCEDURE — 73502 X-RAY EXAM HIP UNI 2-3 VIEWS: CPT

## 2023-12-15 PROCEDURE — 6370000000 HC RX 637 (ALT 250 FOR IP): Performed by: HOSPITALIST

## 2023-12-15 PROCEDURE — 36415 COLL VENOUS BLD VENIPUNCTURE: CPT

## 2023-12-15 PROCEDURE — 72114 X-RAY EXAM L-S SPINE BENDING: CPT

## 2023-12-15 PROCEDURE — G0378 HOSPITAL OBSERVATION PER HR: HCPCS

## 2023-12-15 PROCEDURE — 6370000000 HC RX 637 (ALT 250 FOR IP): Performed by: INTERNAL MEDICINE

## 2023-12-15 PROCEDURE — 80048 BASIC METABOLIC PNL TOTAL CA: CPT

## 2023-12-15 PROCEDURE — 6360000002 HC RX W HCPCS: Performed by: NURSE PRACTITIONER

## 2023-12-15 PROCEDURE — 6370000000 HC RX 637 (ALT 250 FOR IP): Performed by: NURSE PRACTITIONER

## 2023-12-15 RX ORDER — OXYCODONE HYDROCHLORIDE AND ACETAMINOPHEN 5; 325 MG/1; MG/1
1 TABLET ORAL EVERY 6 HOURS PRN
Qty: 12 TABLET | Refills: 0 | Status: SHIPPED | OUTPATIENT
Start: 2023-12-15 | End: 2023-12-18

## 2023-12-15 RX ORDER — CYCLOBENZAPRINE HCL 10 MG
10 TABLET ORAL 3 TIMES DAILY PRN
Qty: 9 TABLET | Refills: 0 | Status: SHIPPED | OUTPATIENT
Start: 2023-12-15 | End: 2023-12-18

## 2023-12-15 RX ORDER — CYCLOBENZAPRINE HCL 10 MG
10 TABLET ORAL 3 TIMES DAILY PRN
Qty: 9 TABLET | Refills: 0 | Status: SHIPPED | OUTPATIENT
Start: 2023-12-15 | End: 2023-12-15 | Stop reason: SDUPTHER

## 2023-12-15 RX ORDER — CYCLOBENZAPRINE HCL 10 MG
10 TABLET ORAL 3 TIMES DAILY PRN
Status: DISCONTINUED | OUTPATIENT
Start: 2023-12-15 | End: 2023-12-16 | Stop reason: HOSPADM

## 2023-12-15 RX ORDER — OXYCODONE HYDROCHLORIDE AND ACETAMINOPHEN 5; 325 MG/1; MG/1
1 TABLET ORAL EVERY 4 HOURS PRN
Status: DISCONTINUED | OUTPATIENT
Start: 2023-12-15 | End: 2023-12-16 | Stop reason: HOSPADM

## 2023-12-15 RX ORDER — OXYCODONE HYDROCHLORIDE AND ACETAMINOPHEN 5; 325 MG/1; MG/1
1 TABLET ORAL EVERY 6 HOURS PRN
Qty: 12 TABLET | Refills: 0 | Status: SHIPPED | OUTPATIENT
Start: 2023-12-15 | End: 2023-12-15 | Stop reason: SDUPTHER

## 2023-12-15 RX ORDER — METHYLPREDNISOLONE 4 MG/1
TABLET ORAL
Qty: 1 KIT | Refills: 0 | Status: SHIPPED | OUTPATIENT
Start: 2023-12-15 | End: 2023-12-21

## 2023-12-15 RX ADMIN — IBUPROFEN 600 MG: 600 TABLET, FILM COATED ORAL at 18:55

## 2023-12-15 RX ADMIN — GUAIFENESIN 600 MG: 600 TABLET ORAL at 20:01

## 2023-12-15 RX ADMIN — KETOROLAC TROMETHAMINE 30 MG: 30 INJECTION, SOLUTION INTRAMUSCULAR; INTRAVENOUS at 22:11

## 2023-12-15 RX ADMIN — ENOXAPARIN SODIUM 40 MG: 100 INJECTION SUBCUTANEOUS at 09:36

## 2023-12-15 RX ADMIN — METHYLPREDNISOLONE 4 MG: 4 TABLET ORAL at 18:55

## 2023-12-15 RX ADMIN — OXYCODONE AND ACETAMINOPHEN 1 TABLET: 5; 325 TABLET ORAL at 07:29

## 2023-12-15 RX ADMIN — METHYLPREDNISOLONE 4 MG: 4 TABLET ORAL at 05:12

## 2023-12-15 RX ADMIN — KETOROLAC TROMETHAMINE 30 MG: 30 INJECTION, SOLUTION INTRAMUSCULAR; INTRAVENOUS at 14:19

## 2023-12-15 RX ADMIN — POTASSIUM BICARBONATE 40 MEQ: 782 TABLET, EFFERVESCENT ORAL at 09:37

## 2023-12-15 RX ADMIN — Medication 10 ML: at 09:38

## 2023-12-15 RX ADMIN — CYCLOBENZAPRINE 10 MG: 10 TABLET, FILM COATED ORAL at 22:11

## 2023-12-15 RX ADMIN — IBUPROFEN 600 MG: 600 TABLET, FILM COATED ORAL at 09:37

## 2023-12-15 RX ADMIN — POTASSIUM BICARBONATE 40 MEQ: 782 TABLET, EFFERVESCENT ORAL at 20:01

## 2023-12-15 RX ADMIN — DICLOFENAC SODIUM 2 G: 10 GEL TOPICAL at 22:11

## 2023-12-15 RX ADMIN — IBUPROFEN 600 MG: 600 TABLET, FILM COATED ORAL at 11:44

## 2023-12-15 RX ADMIN — GUAIFENESIN 600 MG: 600 TABLET ORAL at 09:37

## 2023-12-15 RX ADMIN — METHYLPREDNISOLONE 8 MG: 4 TABLET ORAL at 20:02

## 2023-12-15 RX ADMIN — Medication 10 ML: at 22:11

## 2023-12-15 RX ADMIN — CYCLOBENZAPRINE 10 MG: 10 TABLET, FILM COATED ORAL at 05:12

## 2023-12-15 RX ADMIN — PROPRANOLOL HYDROCHLORIDE 40 MG: 10 TABLET ORAL at 20:01

## 2023-12-15 RX ADMIN — OXYCODONE AND ACETAMINOPHEN 1 TABLET: 5; 325 TABLET ORAL at 15:51

## 2023-12-15 RX ADMIN — METHYLPREDNISOLONE 4 MG: 4 TABLET ORAL at 11:44

## 2023-12-15 RX ADMIN — OXYCODONE AND ACETAMINOPHEN 1 TABLET: 5; 325 TABLET ORAL at 11:44

## 2023-12-15 RX ADMIN — KETOROLAC TROMETHAMINE 30 MG: 30 INJECTION, SOLUTION INTRAMUSCULAR; INTRAVENOUS at 05:45

## 2023-12-15 RX ADMIN — OXYCODONE AND ACETAMINOPHEN 1 TABLET: 5; 325 TABLET ORAL at 20:01

## 2023-12-15 RX ADMIN — CYCLOBENZAPRINE 10 MG: 10 TABLET, FILM COATED ORAL at 14:19

## 2023-12-15 RX ADMIN — OXYCODONE AND ACETAMINOPHEN 1 TABLET: 5; 325 TABLET ORAL at 01:37

## 2023-12-15 ASSESSMENT — PAIN SCALES - GENERAL
PAINLEVEL_OUTOF10: 8
PAINLEVEL_OUTOF10: 7
PAINLEVEL_OUTOF10: 9
PAINLEVEL_OUTOF10: 10
PAINLEVEL_OUTOF10: 10
PAINLEVEL_OUTOF10: 9

## 2023-12-15 ASSESSMENT — PAIN DESCRIPTION - ORIENTATION
ORIENTATION: LOWER
ORIENTATION: RIGHT;LOWER

## 2023-12-15 ASSESSMENT — PAIN DESCRIPTION - PAIN TYPE: TYPE: ACUTE PAIN

## 2023-12-15 ASSESSMENT — PAIN DESCRIPTION - FREQUENCY: FREQUENCY: CONTINUOUS

## 2023-12-15 ASSESSMENT — PAIN DESCRIPTION - DESCRIPTORS
DESCRIPTORS: ACHING;DISCOMFORT

## 2023-12-15 ASSESSMENT — PAIN DESCRIPTION - LOCATION
LOCATION: BACK

## 2023-12-15 ASSESSMENT — PAIN DESCRIPTION - ONSET: ONSET: ON-GOING

## 2023-12-15 ASSESSMENT — PAIN - FUNCTIONAL ASSESSMENT: PAIN_FUNCTIONAL_ASSESSMENT: PREVENTS OR INTERFERES SOME ACTIVE ACTIVITIES AND ADLS

## 2023-12-15 NOTE — PROGRESS NOTES
Notified Jess Coronado MD regarding pt request to go over pain management. Pain better at beginning of shift and progressively worsened by end. Waiting for response.

## 2023-12-15 NOTE — DISCHARGE SUMMARY
MEQ disintegrating tablet Take 2 tablets by mouth daily for 15 days  Qty: 30 tablet, Refills: 0      Magnesium Oxide -Mg Supplement (MAG-OXIDE) 200 MG TABS Take 400 mg by mouth daily for 15 days  Qty: 30 tablet, Refills: 0      ketorolac (TORADOL) 10 MG tablet Take 1 tablet by mouth every 6 hours as needed for Pain TAKE WITH FOOD  Qty: 12 tablet, Refills: 0      meloxicam (MOBIC) 15 MG tablet Take 15 mg by mouth daily      butalbital-acetaminophen-caffeine (FIORICET, ESGIC) -40 MG per tablet Take 1 tablet by mouth every 4 hours as needed for Headaches      diclofenac sodium (VOLTAREN) 1 % GEL Apply 4 g topically 4 times daily  Qty: 1 Tube, Refills: 3      triamterene-hydrochlorothiazide (MAXZIDE) 75-50 MG per tablet Take 1 tablet by mouth daily      esomeprazole Magnesium (NEXIUM) 20 MG PACK Take 20 mg by mouth daily      Fexofenadine HCl (ALLEGRA PO) Take 1 tablet by mouth daily      propranolol (INDERAL) 40 MG tablet Take 40 mg by mouth 2 times daily. Current Discharge Medication List        STOP taking these medications       methocarbamol (ROBAXIN-750) 750 MG tablet Comments:   Reason for Stopping:         dexamethasone (DECADRON) 4 MG/ML injection Comments:   Reason for Stopping:         HYDROcodone-acetaminophen ([place]) 5-325 MG per tablet Comments:   Reason for Stopping:         POTASSIUM CHLORIDE PO Comments:   Reason for Stopping:         Loratadine 10 MG CAPS Comments:   Reason for Stopping:               Significant Test Results    MRI LUMBAR SPINE WO CONTRAST    Result Date: 12/14/2023  EXAMINATION: MRI OF THE LUMBAR SPINE WITHOUT CONTRAST, 12/14/2023 1:03 pm TECHNIQUE: Multiplanar multisequence MRI of the lumbar spine was performed without the administration of intravenous contrast. COMPARISON: None.  HISTORY: ORDERING SYSTEM PROVIDED HISTORY: intractable pain TECHNOLOGIST PROVIDED HISTORY: Reason for exam:->intractable pain What is the sedation requirement?->None Reason for Exam: Right The uterus is not enlarged. No free fluid in the pelvis. No bulky lymphadenopathy. Bones/Soft Tissues: No acute fracture or destruction at the pelvis and hips. No collapse or subluxation at the lumbar spine but there is general disc disease in the lumbar spine. .     1.  No thoracic or abdominal aortic aneurysm or dissection. There is no periaortic hematoma. Some mild atherosclerosis but without significant stenoses. 2.  Thickening of the gastric mucosal folds suggesting gastritis. There likely is some reflux in the distal esophagus as well. . 3.  No acute process is seen within the abdomen and pelvis. 4.  Diverticulosis of the colon but without signs of diverticulitis. 5.  Previous cholecystectomy. XR CHEST PORTABLE    Result Date: 12/12/2023  EXAMINATION: ONE XRAY VIEW OF THE CHEST 12/12/2023 11:22 pm COMPARISON: None. HISTORY: ORDERING SYSTEM PROVIDED HISTORY: chest pain, r/o cardiac etiology TECHNOLOGIST PROVIDED HISTORY: Reason for exam:->chest pain, r/o cardiac etiology Reason for Exam: chest pain, r/o cardiac etiology FINDINGS: The heart is normal.  The pulmonary vessels are normal.  No consolidation or effusion is seen. The bones are intact. No acute cardiopulmonary abnormality. Consults:     IP CONSULT TO SPINE    Labs:     Recent Labs     12/12/23 2321 12/13/23 1745 12/14/23  0554   WBC 12.2* 11.3* 8.9   HGB 14.0 13.1 13.3   HCT 41.3 39.3 39.8    258 246     Recent Labs     12/12/23 2321 12/13/23  1745 12/14/23  0554   * 133* 134*   K 2.9* 2.9* 4.0   CL 96* 98* 103   CO2 26 26 25   BUN 13 11 9   CREATININE 1.1 0.8 0.7   CALCIUM 9.9 9.0 9.0   MG 1.50* 1.70*  --      Recent Labs     12/12/23 2321 12/13/23  0059 12/13/23  1745   PROBNP 122  --   --    TROPHS 6 7 7     No results for input(s): \"LABA1C\" in the last 72 hours. Recent Labs     12/12/23 2321 12/13/23  1745   AST 24 19   ALT 16 12   BILITOT 0.4 0.6   ALKPHOS 69 61     No results for input(s):  \"INR\", \"LACTA\",

## 2023-12-15 NOTE — CARE COORDINATION
CASE MANAGEMENT DISCHARGE SUMMARY      Discharge to: Home [facility]    Precertification completed: [id number] [place] Exemption Notification (HENS) completed: N/A    IMM given: (date) N/A    New Durable Medical Equipment ordered/agency: N/A    Transportation:    Family/car: Personal       Confirmed discharge plan with:     Patient: yes       RN, name: Glenis    Note: Discharging nurse to complete AJAY, reconcile AVS, and place final copy with patient's discharge packet. RN to ensure that written prescriptions for  Level II medications are sent with patient to the facility as per protocol.       Josie Maurer RN

## 2023-12-15 NOTE — CARE COORDINATION
Chart reviewed day 2. Pt is followed by IM. Per RN note this am pts back pain is progressively worsening. Pt IPTA w/spouse. As of now no needs at this time. Will follow for needs as they arise.  Electronically signed by Delgado Sage RN on 12/15/2023 at 9:31 AM

## 2023-12-16 VITALS
OXYGEN SATURATION: 96 % | WEIGHT: 165 LBS | HEART RATE: 70 BPM | SYSTOLIC BLOOD PRESSURE: 109 MMHG | TEMPERATURE: 97.7 F | RESPIRATION RATE: 16 BRPM | HEIGHT: 65 IN | DIASTOLIC BLOOD PRESSURE: 65 MMHG | BODY MASS INDEX: 27.49 KG/M2

## 2023-12-16 LAB
ANION GAP SERPL CALCULATED.3IONS-SCNC: 8 MMOL/L (ref 3–16)
ANION GAP SERPL CALCULATED.3IONS-SCNC: 9 MMOL/L (ref 3–16)
BUN SERPL-MCNC: 10 MG/DL (ref 7–20)
BUN SERPL-MCNC: 9 MG/DL (ref 7–20)
CALCIUM SERPL-MCNC: 8.7 MG/DL (ref 8.3–10.6)
CALCIUM SERPL-MCNC: 9.1 MG/DL (ref 8.3–10.6)
CHLORIDE SERPL-SCNC: 101 MMOL/L (ref 99–110)
CHLORIDE SERPL-SCNC: 98 MMOL/L (ref 99–110)
CO2 SERPL-SCNC: 27 MMOL/L (ref 21–32)
CO2 SERPL-SCNC: 28 MMOL/L (ref 21–32)
CREAT SERPL-MCNC: 0.8 MG/DL (ref 0.6–1.1)
CREAT SERPL-MCNC: 0.8 MG/DL (ref 0.6–1.1)
EKG ATRIAL RATE: 62 BPM
EKG DIAGNOSIS: NORMAL
EKG P AXIS: 57 DEGREES
EKG P-R INTERVAL: 166 MS
EKG Q-T INTERVAL: 434 MS
EKG QRS DURATION: 68 MS
EKG QTC CALCULATION (BAZETT): 440 MS
EKG R AXIS: -8 DEGREES
EKG T AXIS: 42 DEGREES
EKG VENTRICULAR RATE: 62 BPM
GFR SERPLBLD CREATININE-BSD FMLA CKD-EPI: >60 ML/MIN/{1.73_M2}
GFR SERPLBLD CREATININE-BSD FMLA CKD-EPI: >60 ML/MIN/{1.73_M2}
GLUCOSE SERPL-MCNC: 128 MG/DL (ref 70–99)
GLUCOSE SERPL-MCNC: 133 MG/DL (ref 70–99)
MAGNESIUM SERPL-MCNC: 1.8 MG/DL (ref 1.8–2.4)
POTASSIUM SERPL-SCNC: 4.4 MMOL/L (ref 3.5–5.1)
POTASSIUM SERPL-SCNC: 4.8 MMOL/L (ref 3.5–5.1)
SODIUM SERPL-SCNC: 134 MMOL/L (ref 136–145)
SODIUM SERPL-SCNC: 137 MMOL/L (ref 136–145)

## 2023-12-16 PROCEDURE — G0378 HOSPITAL OBSERVATION PER HR: HCPCS

## 2023-12-16 PROCEDURE — 2580000003 HC RX 258: Performed by: NURSE PRACTITIONER

## 2023-12-16 PROCEDURE — 36415 COLL VENOUS BLD VENIPUNCTURE: CPT

## 2023-12-16 PROCEDURE — 6370000000 HC RX 637 (ALT 250 FOR IP): Performed by: INTERNAL MEDICINE

## 2023-12-16 PROCEDURE — 6370000000 HC RX 637 (ALT 250 FOR IP): Performed by: NURSE PRACTITIONER

## 2023-12-16 PROCEDURE — 83735 ASSAY OF MAGNESIUM: CPT

## 2023-12-16 PROCEDURE — 93005 ELECTROCARDIOGRAM TRACING: CPT | Performed by: INTERNAL MEDICINE

## 2023-12-16 PROCEDURE — 80048 BASIC METABOLIC PNL TOTAL CA: CPT

## 2023-12-16 PROCEDURE — 6360000002 HC RX W HCPCS: Performed by: NURSE PRACTITIONER

## 2023-12-16 RX ADMIN — IBUPROFEN 600 MG: 600 TABLET, FILM COATED ORAL at 08:44

## 2023-12-16 RX ADMIN — DICLOFENAC SODIUM 2 G: 10 GEL TOPICAL at 08:45

## 2023-12-16 RX ADMIN — METHYLPREDNISOLONE 4 MG: 4 TABLET ORAL at 08:43

## 2023-12-16 RX ADMIN — POTASSIUM BICARBONATE 40 MEQ: 782 TABLET, EFFERVESCENT ORAL at 08:43

## 2023-12-16 RX ADMIN — OXYCODONE AND ACETAMINOPHEN 1 TABLET: 5; 325 TABLET ORAL at 08:44

## 2023-12-16 RX ADMIN — Medication 10 ML: at 08:48

## 2023-12-16 RX ADMIN — GUAIFENESIN 600 MG: 600 TABLET ORAL at 08:44

## 2023-12-16 RX ADMIN — TRIAMTERENE AND HYDROCHLOROTHIAZIDE 2 TABLET: 37.5; 25 TABLET ORAL at 08:44

## 2023-12-16 RX ADMIN — OXYCODONE AND ACETAMINOPHEN 1 TABLET: 5; 325 TABLET ORAL at 04:15

## 2023-12-16 RX ADMIN — OXYCODONE AND ACETAMINOPHEN 1 TABLET: 5; 325 TABLET ORAL at 00:09

## 2023-12-16 RX ADMIN — ENOXAPARIN SODIUM 40 MG: 100 INJECTION SUBCUTANEOUS at 08:49

## 2023-12-16 ASSESSMENT — PAIN DESCRIPTION - LOCATION
LOCATION: BACK
LOCATION: BACK

## 2023-12-16 ASSESSMENT — PAIN DESCRIPTION - DESCRIPTORS: DESCRIPTORS: ACHING;CRAMPING;DISCOMFORT

## 2023-12-16 ASSESSMENT — PAIN DESCRIPTION - ONSET: ONSET: ON-GOING

## 2023-12-16 ASSESSMENT — PAIN DESCRIPTION - ORIENTATION
ORIENTATION: RIGHT
ORIENTATION: LEFT

## 2023-12-16 ASSESSMENT — PAIN SCALES - GENERAL
PAINLEVEL_OUTOF10: 7
PAINLEVEL_OUTOF10: 5

## 2023-12-16 ASSESSMENT — PAIN - FUNCTIONAL ASSESSMENT: PAIN_FUNCTIONAL_ASSESSMENT: PREVENTS OR INTERFERES WITH ALL ACTIVE AND SOME PASSIVE ACTIVITIES

## 2023-12-16 ASSESSMENT — PAIN DESCRIPTION - PAIN TYPE: TYPE: ACUTE PAIN

## 2023-12-16 ASSESSMENT — PAIN DESCRIPTION - FREQUENCY: FREQUENCY: CONTINUOUS

## 2023-12-16 NOTE — PLAN OF CARE
Problem: Discharge Planning  Goal: Discharge to home or other facility with appropriate resources  Outcome: Progressing     Problem: Pain  Goal: Verbalizes/displays adequate comfort level or baseline comfort level  Outcome: Progressing  Pt scoring pain on 0-10 scale. Pain medications given per MAR. Pt instructed to call out when pain level increasing. Call light within reach. Problem: Safety - Adult  Goal: Free from fall injury  Outcome: Progressing  Bed in lowest position, wheels locked, 2/4 side rails up, nonskid footwear on. Bed/ chair check alarm in place, call light within reach. Pt instructed to call out when needing assistance. Pt stated understanding.

## 2023-12-16 NOTE — PROGRESS NOTES
No call noted from [facility] but saw pt HR on monitor 30s-40s. Pt asymptomatic when went into room, VSS, HR in 50s. Ashley Stovall MD notified. Waiting for response.

## 2023-12-16 NOTE — PROGRESS NOTES
Writer and patient reviewed AVS. All questions answered. Patient provided with scripts. Writer escorted patient from the unit to family vehicle.

## 2023-12-17 NOTE — DISCHARGE SUMMARY
Hospital Medicine Discharge Summary    Patient: Kai Harp   : 1965     Admit Date: 2023   Discharge Date: 2023   pt was kept over night as pharmacy was closed to get meds   Disposition:  [x]Home   []HHC  []SNF  []ECF  []Acute Rehab  []LTAC  []Hospice  Code status:  [x]Full  []DNR/CCA  []Limited (DNR/CCA with Do Not Intubate)  []DNRCC  Condition at Discharge: Stable  Primary Care Provider: MAY Valdez - CNP    Admitting Provider: Ashleigh Wilkinson DO  Discharge Provider: Nathalia Ennis MD     Discharge Diagnoses: Active Hospital Problems    Diagnosis     Intractable back pain [M54.9]     Hypertension [I10]        Presenting Admission History:         60 y.o. female, with PMH of HTN and tobacco use, who presented to [facility] with back pain. History obtained from the patient and review of EMR. The patient stated she has a history of chronic back pain and back surgeries. However, she stated on Tuesday she was at home laying around and suddenly felt pain in her chest that radiated to the middle of her back. The patient stated she tried to stand up because she thought she had to vomit. She stated she almost passed out when she was walking to the bathroom. The patient stated she was seen in the emergency department that evening because she had \"the thing in my chest, that thoracic aneurysm\". She stated that it was less than 5 cm so they did not do surgery on it right now. However, a CTA chest was obtained that revealed no aneurysm thoracic and/or abdominal aortic. In the emergency department a CTA chest, abdomen and pelvis was obtained that revealed no thoracic or abdominal aortic aneurysm or dissection. There is no periaortic hematoma. Some mild atherosclerosis but without significant stenosis. Thickening of the gastric mucosal fold suggesting gastritis. No acute process seen within the abdomen or pelvis.   The patient's UA was negative for infection, but UDS was

## 2024-01-24 ENCOUNTER — CLINICAL DOCUMENTATION (OUTPATIENT)
Dept: CASE MANAGEMENT | Age: 59
End: 2024-01-24
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
